# Patient Record
Sex: MALE | Race: WHITE | Employment: OTHER | URBAN - METROPOLITAN AREA
[De-identification: names, ages, dates, MRNs, and addresses within clinical notes are randomized per-mention and may not be internally consistent; named-entity substitution may affect disease eponyms.]

---

## 2018-01-01 ENCOUNTER — APPOINTMENT (OUTPATIENT)
Dept: GENERAL RADIOLOGY | Facility: CLINIC | Age: 83
DRG: 871 | End: 2018-01-01
Attending: NURSE PRACTITIONER
Payer: MEDICARE

## 2018-01-01 ENCOUNTER — APPOINTMENT (OUTPATIENT)
Dept: PHYSICAL THERAPY | Facility: CLINIC | Age: 83
DRG: 871 | End: 2018-01-01
Payer: MEDICARE

## 2018-01-01 ENCOUNTER — APPOINTMENT (OUTPATIENT)
Dept: SPEECH THERAPY | Facility: CLINIC | Age: 83
DRG: 871 | End: 2018-01-01
Payer: MEDICARE

## 2018-01-01 ENCOUNTER — APPOINTMENT (OUTPATIENT)
Dept: PHYSICAL THERAPY | Facility: CLINIC | Age: 83
DRG: 871 | End: 2018-01-01
Attending: FAMILY MEDICINE
Payer: MEDICARE

## 2018-01-01 ENCOUNTER — HOSPITAL ENCOUNTER (INPATIENT)
Facility: CLINIC | Age: 83
LOS: 6 days | Discharge: HOSPICE | DRG: 871 | End: 2018-10-08
Attending: NURSE PRACTITIONER | Admitting: INTERNAL MEDICINE
Payer: MEDICARE

## 2018-01-01 ENCOUNTER — APPOINTMENT (OUTPATIENT)
Dept: SPEECH THERAPY | Facility: CLINIC | Age: 83
DRG: 871 | End: 2018-01-01
Attending: INTERNAL MEDICINE
Payer: MEDICARE

## 2018-01-01 ENCOUNTER — APPOINTMENT (OUTPATIENT)
Dept: CARDIOLOGY | Facility: CLINIC | Age: 83
DRG: 871 | End: 2018-01-01
Attending: FAMILY MEDICINE
Payer: MEDICARE

## 2018-01-01 ENCOUNTER — OFFICE VISIT (OUTPATIENT)
Dept: FAMILY MEDICINE | Facility: OTHER | Age: 83
End: 2018-01-01
Payer: MEDICARE

## 2018-01-01 ENCOUNTER — APPOINTMENT (OUTPATIENT)
Dept: GENERAL RADIOLOGY | Facility: CLINIC | Age: 83
DRG: 871 | End: 2018-01-01
Attending: FAMILY MEDICINE
Payer: MEDICARE

## 2018-01-01 VITALS
OXYGEN SATURATION: 97 % | TEMPERATURE: 96.6 F | BODY MASS INDEX: 16.14 KG/M2 | HEIGHT: 68 IN | DIASTOLIC BLOOD PRESSURE: 73 MMHG | RESPIRATION RATE: 18 BRPM | WEIGHT: 106.48 LBS | HEART RATE: 70 BPM | SYSTOLIC BLOOD PRESSURE: 105 MMHG

## 2018-01-01 VITALS
TEMPERATURE: 97.4 F | DIASTOLIC BLOOD PRESSURE: 40 MMHG | RESPIRATION RATE: 36 BRPM | HEART RATE: 67 BPM | SYSTOLIC BLOOD PRESSURE: 61 MMHG

## 2018-01-01 DIAGNOSIS — R13.19 OTHER DYSPHAGIA: Primary | ICD-10-CM

## 2018-01-01 DIAGNOSIS — A41.9 SEPTIC SHOCK (H): ICD-10-CM

## 2018-01-01 DIAGNOSIS — R65.21 SEPTIC SHOCK (H): ICD-10-CM

## 2018-01-01 DIAGNOSIS — E43 SEVERE PROTEIN-CALORIE MALNUTRITION (H): ICD-10-CM

## 2018-01-01 DIAGNOSIS — N39.0 URINARY TRACT INFECTION IN MALE: ICD-10-CM

## 2018-01-01 DIAGNOSIS — R13.12 OROPHARYNGEAL DYSPHAGIA: ICD-10-CM

## 2018-01-01 DIAGNOSIS — R13.13 PHARYNGEAL DYSPHAGIA: Primary | ICD-10-CM

## 2018-01-01 DIAGNOSIS — J69.0 ASPIRATION PNEUMONIA OF LEFT LOWER LOBE, UNSPECIFIED ASPIRATION PNEUMONIA TYPE (H): ICD-10-CM

## 2018-01-01 LAB
ALBUMIN SERPL-MCNC: 2.4 G/DL (ref 3.4–5)
ALBUMIN UR-MCNC: 300 MG/DL
ALP SERPL-CCNC: 61 U/L (ref 40–150)
ALT SERPL W P-5'-P-CCNC: 14 U/L (ref 0–70)
ANION GAP SERPL CALCULATED.3IONS-SCNC: 10 MMOL/L (ref 3–14)
ANION GAP SERPL CALCULATED.3IONS-SCNC: 15 MMOL/L (ref 3–14)
ANION GAP SERPL CALCULATED.3IONS-SCNC: 5 MMOL/L (ref 3–14)
ANION GAP SERPL CALCULATED.3IONS-SCNC: 8 MMOL/L (ref 3–14)
ANION GAP SERPL CALCULATED.3IONS-SCNC: 9 MMOL/L (ref 3–14)
APPEARANCE UR: ABNORMAL
AST SERPL W P-5'-P-CCNC: 20 U/L (ref 0–45)
BACTERIA #/AREA URNS HPF: ABNORMAL /HPF
BACTERIA SPEC CULT: NO GROWTH
BACTERIA SPEC CULT: NORMAL
BACTERIA SPEC CULT: NORMAL
BASE DEFICIT BLDV-SCNC: 3.3 MMOL/L
BASE DEFICIT BLDV-SCNC: 8 MMOL/L
BASOPHILS # BLD AUTO: 0 10E9/L (ref 0–0.2)
BASOPHILS NFR BLD AUTO: 0.3 %
BILIRUB SERPL-MCNC: 1 MG/DL (ref 0.2–1.3)
BILIRUB UR QL STRIP: NEGATIVE
BUN SERPL-MCNC: 17 MG/DL (ref 7–30)
BUN SERPL-MCNC: 22 MG/DL (ref 7–30)
BUN SERPL-MCNC: 3 MG/DL (ref 7–30)
BUN SERPL-MCNC: 4 MG/DL (ref 7–30)
BUN SERPL-MCNC: 8 MG/DL (ref 7–30)
CA-I BLD-MCNC: 4 MG/DL (ref 4.4–5.2)
CA-I BLD-MCNC: 4.2 MG/DL (ref 4.4–5.2)
CA-I BLD-MCNC: 4.3 MG/DL (ref 4.4–5.2)
CA-I SERPL ISE-MCNC: 4.1 MG/DL (ref 4.4–5.2)
CA-I SERPL ISE-MCNC: 4.4 MG/DL (ref 4.4–5.2)
CALCIUM SERPL-MCNC: 6.7 MG/DL (ref 8.5–10.1)
CALCIUM SERPL-MCNC: 6.8 MG/DL (ref 8.5–10.1)
CALCIUM SERPL-MCNC: 7.1 MG/DL (ref 8.5–10.1)
CALCIUM SERPL-MCNC: 7.6 MG/DL (ref 8.5–10.1)
CALCIUM SERPL-MCNC: 8.6 MG/DL (ref 8.5–10.1)
CHLORIDE SERPL-SCNC: 103 MMOL/L (ref 94–109)
CHLORIDE SERPL-SCNC: 108 MMOL/L (ref 94–109)
CHLORIDE SERPL-SCNC: 108 MMOL/L (ref 94–109)
CHLORIDE SERPL-SCNC: 110 MMOL/L (ref 94–109)
CHLORIDE SERPL-SCNC: 112 MMOL/L (ref 94–109)
CO2 SERPL-SCNC: 20 MMOL/L (ref 20–32)
CO2 SERPL-SCNC: 21 MMOL/L (ref 20–32)
CO2 SERPL-SCNC: 24 MMOL/L (ref 20–32)
CO2 SERPL-SCNC: 25 MMOL/L (ref 20–32)
CO2 SERPL-SCNC: 26 MMOL/L (ref 20–32)
COLOR UR AUTO: ABNORMAL
CREAT SERPL-MCNC: 0.59 MG/DL (ref 0.66–1.25)
CREAT SERPL-MCNC: 0.78 MG/DL (ref 0.66–1.25)
CREAT SERPL-MCNC: 0.81 MG/DL (ref 0.66–1.25)
CREAT SERPL-MCNC: 0.86 MG/DL (ref 0.66–1.25)
CREAT SERPL-MCNC: 1.08 MG/DL (ref 0.66–1.25)
CRP SERPL-MCNC: 60.4 MG/L (ref 0–8)
DIFFERENTIAL METHOD BLD: ABNORMAL
EOSINOPHIL NFR BLD AUTO: 0.6 %
ERYTHROCYTE [DISTWIDTH] IN BLOOD BY AUTOMATED COUNT: 17.4 % (ref 10–15)
ERYTHROCYTE [DISTWIDTH] IN BLOOD BY AUTOMATED COUNT: 17.5 % (ref 10–15)
FERRITIN SERPL-MCNC: 655 NG/ML (ref 26–388)
FOLATE SERPL-MCNC: 14.4 NG/ML
GFR SERPL CREATININE-BSD FRML MDRD: 64 ML/MIN/1.7M2
GFR SERPL CREATININE-BSD FRML MDRD: 83 ML/MIN/1.7M2
GFR SERPL CREATININE-BSD FRML MDRD: 90 ML/MIN/1.7M2
GFR SERPL CREATININE-BSD FRML MDRD: >90 ML/MIN/1.7M2
GFR SERPL CREATININE-BSD FRML MDRD: >90 ML/MIN/1.7M2
GLUCOSE BLDC GLUCOMTR-MCNC: 106 MG/DL (ref 70–99)
GLUCOSE BLDC GLUCOMTR-MCNC: 113 MG/DL (ref 70–99)
GLUCOSE BLDC GLUCOMTR-MCNC: 113 MG/DL (ref 70–99)
GLUCOSE BLDC GLUCOMTR-MCNC: 119 MG/DL (ref 70–99)
GLUCOSE BLDC GLUCOMTR-MCNC: 77 MG/DL (ref 70–99)
GLUCOSE BLDC GLUCOMTR-MCNC: 81 MG/DL (ref 70–99)
GLUCOSE BLDC GLUCOMTR-MCNC: 85 MG/DL (ref 70–99)
GLUCOSE BLDC GLUCOMTR-MCNC: 85 MG/DL (ref 70–99)
GLUCOSE BLDC GLUCOMTR-MCNC: 88 MG/DL (ref 70–99)
GLUCOSE BLDC GLUCOMTR-MCNC: 93 MG/DL (ref 70–99)
GLUCOSE BLDC GLUCOMTR-MCNC: 95 MG/DL (ref 70–99)
GLUCOSE BLDC GLUCOMTR-MCNC: 99 MG/DL (ref 70–99)
GLUCOSE SERPL-MCNC: 107 MG/DL (ref 70–99)
GLUCOSE SERPL-MCNC: 221 MG/DL (ref 70–99)
GLUCOSE SERPL-MCNC: 72 MG/DL (ref 70–99)
GLUCOSE SERPL-MCNC: 85 MG/DL (ref 70–99)
GLUCOSE SERPL-MCNC: 94 MG/DL (ref 70–99)
GLUCOSE UR STRIP-MCNC: NEGATIVE MG/DL
HBA1C MFR BLD: 5.1 % (ref 0–5.6)
HCO3 BLDV-SCNC: 19 MMOL/L (ref 21–28)
HCO3 BLDV-SCNC: 22 MMOL/L (ref 21–28)
HCT VFR BLD AUTO: 25.9 % (ref 40–53)
HCT VFR BLD AUTO: 26.9 % (ref 40–53)
HCT VFR BLD AUTO: 27.8 % (ref 40–53)
HCT VFR BLD AUTO: 37.7 % (ref 40–53)
HEMOCCULT SP1 STL QL: NEGATIVE
HGB BLD-MCNC: 12.2 G/DL (ref 13.3–17.7)
HGB BLD-MCNC: 8.5 G/DL (ref 13.3–17.7)
HGB BLD-MCNC: 8.8 G/DL (ref 13.3–17.7)
HGB BLD-MCNC: 9 G/DL (ref 13.3–17.7)
HGB BLD-MCNC: 9.1 G/DL (ref 13.3–17.7)
HGB UR QL STRIP: ABNORMAL
IMM GRANULOCYTES # BLD: 0.2 10E9/L (ref 0–0.4)
IMM GRANULOCYTES NFR BLD: 1.1 %
IRON SATN MFR SERPL: 24 % (ref 15–46)
IRON SERPL-MCNC: 24 UG/DL (ref 35–180)
KETONES UR STRIP-MCNC: NEGATIVE MG/DL
LACTATE BLD-SCNC: 0.8 MMOL/L (ref 0.7–2)
LACTATE BLD-SCNC: 1.3 MMOL/L (ref 0.7–2)
LACTATE BLD-SCNC: 8.2 MMOL/L (ref 0.7–2)
LEUKOCYTE ESTERASE UR QL STRIP: ABNORMAL
LYMPHOCYTES # BLD AUTO: 1.8 10E9/L (ref 0.8–5.3)
LYMPHOCYTES NFR BLD AUTO: 12.4 %
Lab: NORMAL
MCH RBC QN AUTO: 30.9 PG (ref 26.5–33)
MCH RBC QN AUTO: 31.2 PG (ref 26.5–33)
MCH RBC QN AUTO: 31.2 PG (ref 26.5–33)
MCH RBC QN AUTO: 31.7 PG (ref 26.5–33)
MCHC RBC AUTO-ENTMCNC: 32.4 G/DL (ref 31.5–36.5)
MCHC RBC AUTO-ENTMCNC: 32.7 G/DL (ref 31.5–36.5)
MCHC RBC AUTO-ENTMCNC: 32.8 G/DL (ref 31.5–36.5)
MCHC RBC AUTO-ENTMCNC: 33.5 G/DL (ref 31.5–36.5)
MCV RBC AUTO: 94 FL (ref 78–100)
MCV RBC AUTO: 95 FL (ref 78–100)
MCV RBC AUTO: 95 FL (ref 78–100)
MCV RBC AUTO: 96 FL (ref 78–100)
MONOCYTES # BLD AUTO: 0.8 10E9/L (ref 0–1.3)
MONOCYTES NFR BLD AUTO: 5.8 %
NEUTROPHILS # BLD AUTO: 11.4 10E9/L (ref 1.6–8.3)
NEUTROPHILS NFR BLD AUTO: 79.8 %
NITRATE UR QL: POSITIVE
NRBC # BLD AUTO: 0 10*3/UL
NRBC BLD AUTO-RTO: 0 /100
O2/TOTAL GAS SETTING VFR VENT: 21 %
O2/TOTAL GAS SETTING VFR VENT: 21 %
PCO2 BLDV: 38 MM HG (ref 40–50)
PCO2 BLDV: 43 MM HG (ref 40–50)
PH BLDV: 7.25 PH (ref 7.32–7.43)
PH BLDV: 7.37 PH (ref 7.32–7.43)
PH UR STRIP: 6.5 PH (ref 5–7)
PLATELET # BLD AUTO: 224 10E9/L (ref 150–450)
PLATELET # BLD AUTO: 236 10E9/L (ref 150–450)
PLATELET # BLD AUTO: 300 10E9/L (ref 150–450)
PLATELET # BLD AUTO: 396 10E9/L (ref 150–450)
PO2 BLDV: 18 MM HG (ref 25–47)
PO2 BLDV: 37 MM HG (ref 25–47)
POTASSIUM SERPL-SCNC: 3 MMOL/L (ref 3.4–5.3)
POTASSIUM SERPL-SCNC: 3.2 MMOL/L (ref 3.4–5.3)
POTASSIUM SERPL-SCNC: 3.6 MMOL/L (ref 3.4–5.3)
POTASSIUM SERPL-SCNC: 3.6 MMOL/L (ref 3.4–5.3)
POTASSIUM SERPL-SCNC: 3.8 MMOL/L (ref 3.4–5.3)
POTASSIUM SERPL-SCNC: 4 MMOL/L (ref 3.4–5.3)
POTASSIUM SERPL-SCNC: 4.3 MMOL/L (ref 3.4–5.3)
PROT SERPL-MCNC: 7.1 G/DL (ref 6.8–8.8)
RBC # BLD AUTO: 2.75 10E12/L (ref 4.4–5.9)
RBC # BLD AUTO: 2.84 10E12/L (ref 4.4–5.9)
RBC # BLD AUTO: 2.92 10E12/L (ref 4.4–5.9)
RBC # BLD AUTO: 3.91 10E12/L (ref 4.4–5.9)
RBC #/AREA URNS AUTO: >182 /HPF (ref 0–2)
SODIUM SERPL-SCNC: 138 MMOL/L (ref 133–144)
SODIUM SERPL-SCNC: 139 MMOL/L (ref 133–144)
SODIUM SERPL-SCNC: 141 MMOL/L (ref 133–144)
SODIUM SERPL-SCNC: 142 MMOL/L (ref 133–144)
SODIUM SERPL-SCNC: 144 MMOL/L (ref 133–144)
SOURCE: ABNORMAL
SP GR UR STRIP: 1.01 (ref 1–1.03)
SPECIMEN SOURCE: NORMAL
SQUAMOUS #/AREA URNS AUTO: 3 /HPF (ref 0–1)
TIBC SERPL-MCNC: 99 UG/DL (ref 240–430)
TROPONIN I SERPL-MCNC: 0.03 UG/L (ref 0–0.04)
UROBILINOGEN UR STRIP-MCNC: 0.2 MG/DL (ref 0–2)
VIT B12 SERPL-MCNC: 498 PG/ML (ref 193–986)
WBC # BLD AUTO: 14.2 10E9/L (ref 4–11)
WBC # BLD AUTO: 4.6 10E9/L (ref 4–11)
WBC # BLD AUTO: 4.8 10E9/L (ref 4–11)
WBC # BLD AUTO: 7.9 10E9/L (ref 4–11)
WBC #/AREA URNS AUTO: 1382 /HPF (ref 0–5)
WBC CLUMPS #/AREA URNS HPF: PRESENT /HPF

## 2018-01-01 PROCEDURE — 97530 THERAPEUTIC ACTIVITIES: CPT | Mod: GP | Performed by: PHYSICAL THERAPIST

## 2018-01-01 PROCEDURE — 20000003 ZZH R&B ICU

## 2018-01-01 PROCEDURE — A9270 NON-COVERED ITEM OR SERVICE: HCPCS | Mod: GY | Performed by: FAMILY MEDICINE

## 2018-01-01 PROCEDURE — 84484 ASSAY OF TROPONIN QUANT: CPT | Performed by: NURSE PRACTITIONER

## 2018-01-01 PROCEDURE — 85027 COMPLETE CBC AUTOMATED: CPT | Performed by: INTERNAL MEDICINE

## 2018-01-01 PROCEDURE — 25000128 H RX IP 250 OP 636: Performed by: NURSE PRACTITIONER

## 2018-01-01 PROCEDURE — 36556 INSERT NON-TUNNEL CV CATH: CPT | Mod: Z6 | Performed by: NURSE PRACTITIONER

## 2018-01-01 PROCEDURE — 99232 SBSQ HOSP IP/OBS MODERATE 35: CPT | Performed by: FAMILY MEDICINE

## 2018-01-01 PROCEDURE — 81001 URINALYSIS AUTO W/SCOPE: CPT | Performed by: NURSE PRACTITIONER

## 2018-01-01 PROCEDURE — 25000128 H RX IP 250 OP 636: Performed by: HOSPITALIST

## 2018-01-01 PROCEDURE — 83605 ASSAY OF LACTIC ACID: CPT | Performed by: NURSE PRACTITIONER

## 2018-01-01 PROCEDURE — 82330 ASSAY OF CALCIUM: CPT | Performed by: INTERNAL MEDICINE

## 2018-01-01 PROCEDURE — 25000128 H RX IP 250 OP 636: Performed by: INTERNAL MEDICINE

## 2018-01-01 PROCEDURE — 25800025 ZZH RX 258: Performed by: FAMILY MEDICINE

## 2018-01-01 PROCEDURE — 87086 URINE CULTURE/COLONY COUNT: CPT | Performed by: NURSE PRACTITIONER

## 2018-01-01 PROCEDURE — 93005 ELECTROCARDIOGRAM TRACING: CPT | Performed by: NURSE PRACTITIONER

## 2018-01-01 PROCEDURE — 99207 ZZC MOONLIGHTING INDICATOR: CPT | Mod: 59 | Performed by: INTERNAL MEDICINE

## 2018-01-01 PROCEDURE — 82728 ASSAY OF FERRITIN: CPT | Performed by: INTERNAL MEDICINE

## 2018-01-01 PROCEDURE — 82330 ASSAY OF CALCIUM: CPT | Performed by: FAMILY MEDICINE

## 2018-01-01 PROCEDURE — 99233 SBSQ HOSP IP/OBS HIGH 50: CPT | Performed by: INTERNAL MEDICINE

## 2018-01-01 PROCEDURE — 99223 1ST HOSP IP/OBS HIGH 75: CPT | Mod: AI | Performed by: INTERNAL MEDICINE

## 2018-01-01 PROCEDURE — 40000225 ZZH STATISTIC SLP WARD VISIT: Performed by: SPEECH-LANGUAGE PATHOLOGIST

## 2018-01-01 PROCEDURE — 25000132 ZZH RX MED GY IP 250 OP 250 PS 637: Mod: GY | Performed by: FAMILY MEDICINE

## 2018-01-01 PROCEDURE — 00000146 ZZHCL STATISTIC GLUCOSE BY METER IP

## 2018-01-01 PROCEDURE — 80048 BASIC METABOLIC PNL TOTAL CA: CPT | Performed by: FAMILY MEDICINE

## 2018-01-01 PROCEDURE — 85025 COMPLETE CBC W/AUTO DIFF WBC: CPT | Performed by: NURSE PRACTITIONER

## 2018-01-01 PROCEDURE — 40000193 ZZH STATISTIC PT WARD VISIT: Performed by: PHYSICAL THERAPIST

## 2018-01-01 PROCEDURE — 99356 ZZC PROLONGED SERV,INPATIENT,1ST HR: CPT | Performed by: FAMILY MEDICINE

## 2018-01-01 PROCEDURE — 93306 TTE W/DOPPLER COMPLETE: CPT

## 2018-01-01 PROCEDURE — 96368 THER/DIAG CONCURRENT INF: CPT | Performed by: NURSE PRACTITIONER

## 2018-01-01 PROCEDURE — 25500064 ZZH RX 255 OP 636: Performed by: INTERNAL MEDICINE

## 2018-01-01 PROCEDURE — 92611 MOTION FLUOROSCOPY/SWALLOW: CPT | Mod: GN | Performed by: SPEECH-LANGUAGE PATHOLOGIST

## 2018-01-01 PROCEDURE — 82607 VITAMIN B-12: CPT | Performed by: INTERNAL MEDICINE

## 2018-01-01 PROCEDURE — 25000128 H RX IP 250 OP 636: Performed by: FAMILY MEDICINE

## 2018-01-01 PROCEDURE — 96366 THER/PROPH/DIAG IV INF ADDON: CPT | Performed by: NURSE PRACTITIONER

## 2018-01-01 PROCEDURE — 99207 ZZC CDG-MDM COMPONENT: MEETS MODERATE - UP CODED: CPT | Performed by: INTERNAL MEDICINE

## 2018-01-01 PROCEDURE — 40000895 ZZH STATISTIC SLP IP EVAL DEFER: Performed by: SPEECH-LANGUAGE PATHOLOGIST

## 2018-01-01 PROCEDURE — 85018 HEMOGLOBIN: CPT | Performed by: FAMILY MEDICINE

## 2018-01-01 PROCEDURE — 99215 OFFICE O/P EST HI 40 MIN: CPT | Mod: 25 | Performed by: NURSE PRACTITIONER

## 2018-01-01 PROCEDURE — 97162 PT EVAL MOD COMPLEX 30 MIN: CPT | Mod: GP | Performed by: PHYSICAL THERAPIST

## 2018-01-01 PROCEDURE — 80048 BASIC METABOLIC PNL TOTAL CA: CPT | Performed by: INTERNAL MEDICINE

## 2018-01-01 PROCEDURE — 99207 ZZC OFFICE-HOSPITAL ADMIT: CPT | Performed by: FAMILY MEDICINE

## 2018-01-01 PROCEDURE — 71045 X-RAY EXAM CHEST 1 VIEW: CPT | Mod: TC

## 2018-01-01 PROCEDURE — 83036 HEMOGLOBIN GLYCOSYLATED A1C: CPT | Performed by: INTERNAL MEDICINE

## 2018-01-01 PROCEDURE — 83605 ASSAY OF LACTIC ACID: CPT | Performed by: INTERNAL MEDICINE

## 2018-01-01 PROCEDURE — 36556 INSERT NON-TUNNEL CV CATH: CPT | Performed by: NURSE PRACTITIONER

## 2018-01-01 PROCEDURE — 80053 COMPREHEN METABOLIC PANEL: CPT | Performed by: NURSE PRACTITIONER

## 2018-01-01 PROCEDURE — 87040 BLOOD CULTURE FOR BACTERIA: CPT | Performed by: NURSE PRACTITIONER

## 2018-01-01 PROCEDURE — 84132 ASSAY OF SERUM POTASSIUM: CPT | Performed by: INTERNAL MEDICINE

## 2018-01-01 PROCEDURE — 12000000 ZZH R&B MED SURG/OB

## 2018-01-01 PROCEDURE — 83540 ASSAY OF IRON: CPT | Performed by: INTERNAL MEDICINE

## 2018-01-01 PROCEDURE — 96361 HYDRATE IV INFUSION ADD-ON: CPT | Performed by: NURSE PRACTITIONER

## 2018-01-01 PROCEDURE — 40000985 XR CHEST PORT 1 VW

## 2018-01-01 PROCEDURE — 83550 IRON BINDING TEST: CPT | Performed by: INTERNAL MEDICINE

## 2018-01-01 PROCEDURE — 82746 ASSAY OF FOLIC ACID SERUM: CPT | Performed by: INTERNAL MEDICINE

## 2018-01-01 PROCEDURE — 93010 ELECTROCARDIOGRAM REPORT: CPT | Mod: Z6 | Performed by: NURSE PRACTITIONER

## 2018-01-01 PROCEDURE — 92610 EVALUATE SWALLOWING FUNCTION: CPT | Mod: GN | Performed by: SPEECH-LANGUAGE PATHOLOGIST

## 2018-01-01 PROCEDURE — 99285 EMERGENCY DEPT VISIT HI MDM: CPT | Mod: 25 | Performed by: NURSE PRACTITIONER

## 2018-01-01 PROCEDURE — 25000125 ZZHC RX 250: Performed by: NURSE PRACTITIONER

## 2018-01-01 PROCEDURE — 82803 BLOOD GASES ANY COMBINATION: CPT | Performed by: INTERNAL MEDICINE

## 2018-01-01 PROCEDURE — 93005 ELECTROCARDIOGRAM TRACING: CPT

## 2018-01-01 PROCEDURE — 99207 ZZC CDG-MDM COMPONENT: MEETS MODERATE - UP CODED: CPT | Performed by: FAMILY MEDICINE

## 2018-01-01 PROCEDURE — 36415 COLL VENOUS BLD VENIPUNCTURE: CPT | Performed by: INTERNAL MEDICINE

## 2018-01-01 PROCEDURE — 99233 SBSQ HOSP IP/OBS HIGH 50: CPT | Performed by: FAMILY MEDICINE

## 2018-01-01 PROCEDURE — 93306 TTE W/DOPPLER COMPLETE: CPT | Mod: 26 | Performed by: INTERNAL MEDICINE

## 2018-01-01 PROCEDURE — 86140 C-REACTIVE PROTEIN: CPT | Performed by: NURSE PRACTITIONER

## 2018-01-01 PROCEDURE — 82274 ASSAY TEST FOR BLOOD FECAL: CPT | Performed by: INTERNAL MEDICINE

## 2018-01-01 PROCEDURE — 85027 COMPLETE CBC AUTOMATED: CPT | Performed by: FAMILY MEDICINE

## 2018-01-01 PROCEDURE — 3E043XZ INTRODUCTION OF VASOPRESSOR INTO CENTRAL VEIN, PERCUTANEOUS APPROACH: ICD-10-PCS | Performed by: EMERGENCY MEDICINE

## 2018-01-01 PROCEDURE — 74230 X-RAY XM SWLNG FUNCJ C+: CPT | Mod: TC

## 2018-01-01 PROCEDURE — 99239 HOSP IP/OBS DSCHRG MGMT >30: CPT | Performed by: FAMILY MEDICINE

## 2018-01-01 PROCEDURE — 92526 ORAL FUNCTION THERAPY: CPT | Mod: GN | Performed by: SPEECH-LANGUAGE PATHOLOGIST

## 2018-01-01 PROCEDURE — 96365 THER/PROPH/DIAG IV INF INIT: CPT | Performed by: NURSE PRACTITIONER

## 2018-01-01 PROCEDURE — 82803 BLOOD GASES ANY COMBINATION: CPT | Performed by: NURSE PRACTITIONER

## 2018-01-01 RX ORDER — POTASSIUM CHLORIDE 1.5 G/1.58G
20-40 POWDER, FOR SOLUTION ORAL
Status: DISCONTINUED | OUTPATIENT
Start: 2018-01-01 | End: 2018-01-01 | Stop reason: HOSPADM

## 2018-01-01 RX ORDER — POTASSIUM CHLORIDE 7.45 MG/ML
10 INJECTION INTRAVENOUS
Status: DISCONTINUED | OUTPATIENT
Start: 2018-01-01 | End: 2018-01-01 | Stop reason: HOSPADM

## 2018-01-01 RX ORDER — AMOXICILLIN AND CLAVULANATE POTASSIUM 400; 57 MG/5ML; MG/5ML
500 POWDER, FOR SUSPENSION ORAL 3 TIMES DAILY
Qty: 75.6 ML | Refills: 0 | Status: SHIPPED | OUTPATIENT
Start: 2018-01-01 | End: 2018-01-01

## 2018-01-01 RX ORDER — SODIUM CHLORIDE 9 MG/ML
INJECTION, SOLUTION INTRAVENOUS CONTINUOUS
Status: DISCONTINUED | OUTPATIENT
Start: 2018-01-01 | End: 2018-01-01

## 2018-01-01 RX ORDER — DEXTROSE MONOHYDRATE 25 G/50ML
25-50 INJECTION, SOLUTION INTRAVENOUS
Status: DISCONTINUED | OUTPATIENT
Start: 2018-01-01 | End: 2018-01-01 | Stop reason: HOSPADM

## 2018-01-01 RX ORDER — NICOTINE POLACRILEX 4 MG
15-30 LOZENGE BUCCAL
Status: DISCONTINUED | OUTPATIENT
Start: 2018-01-01 | End: 2018-01-01

## 2018-01-01 RX ORDER — NICOTINE POLACRILEX 4 MG
15-30 LOZENGE BUCCAL
Status: DISCONTINUED | OUTPATIENT
Start: 2018-01-01 | End: 2018-01-01 | Stop reason: HOSPADM

## 2018-01-01 RX ORDER — HEPARIN SODIUM,PORCINE 10 UNIT/ML
5-10 VIAL (ML) INTRAVENOUS EVERY 24 HOURS
Status: DISCONTINUED | OUTPATIENT
Start: 2018-01-01 | End: 2018-01-01 | Stop reason: HOSPADM

## 2018-01-01 RX ORDER — LIDOCAINE 40 MG/G
CREAM TOPICAL
Status: DISCONTINUED | OUTPATIENT
Start: 2018-01-01 | End: 2018-01-01

## 2018-01-01 RX ORDER — LORAZEPAM 2 MG/ML
0.5 INJECTION INTRAMUSCULAR EVERY 4 HOURS PRN
Status: DISCONTINUED | OUTPATIENT
Start: 2018-01-01 | End: 2018-01-01 | Stop reason: HOSPADM

## 2018-01-01 RX ORDER — POTASSIUM CHLORIDE 29.8 MG/ML
20 INJECTION INTRAVENOUS
Status: DISCONTINUED | OUTPATIENT
Start: 2018-01-01 | End: 2018-01-01 | Stop reason: HOSPADM

## 2018-01-01 RX ORDER — ONDANSETRON 4 MG/1
4 TABLET, ORALLY DISINTEGRATING ORAL EVERY 6 HOURS PRN
Status: DISCONTINUED | OUTPATIENT
Start: 2018-01-01 | End: 2018-01-01 | Stop reason: HOSPADM

## 2018-01-01 RX ORDER — LIDOCAINE 40 MG/G
CREAM TOPICAL
Status: DISCONTINUED | OUTPATIENT
Start: 2018-01-01 | End: 2018-01-01 | Stop reason: HOSPADM

## 2018-01-01 RX ORDER — DEXTROSE MONOHYDRATE, SODIUM CHLORIDE, AND POTASSIUM CHLORIDE 50; 1.49; 4.5 G/1000ML; G/1000ML; G/1000ML
INJECTION, SOLUTION INTRAVENOUS CONTINUOUS
Status: DISCONTINUED | OUTPATIENT
Start: 2018-01-01 | End: 2018-01-01 | Stop reason: HOSPADM

## 2018-01-01 RX ORDER — POTASSIUM CL/LIDO/0.9 % NACL 10MEQ/0.1L
10 INTRAVENOUS SOLUTION, PIGGYBACK (ML) INTRAVENOUS
Status: DISCONTINUED | OUTPATIENT
Start: 2018-01-01 | End: 2018-01-01 | Stop reason: RX

## 2018-01-01 RX ORDER — ONDANSETRON 2 MG/ML
4 INJECTION INTRAMUSCULAR; INTRAVENOUS EVERY 6 HOURS PRN
Status: DISCONTINUED | OUTPATIENT
Start: 2018-01-01 | End: 2018-01-01 | Stop reason: HOSPADM

## 2018-01-01 RX ORDER — POTASSIUM CHLORIDE 1500 MG/1
20-40 TABLET, EXTENDED RELEASE ORAL
Status: DISCONTINUED | OUTPATIENT
Start: 2018-01-01 | End: 2018-01-01 | Stop reason: HOSPADM

## 2018-01-01 RX ORDER — HEPARIN SODIUM,PORCINE 10 UNIT/ML
5-10 VIAL (ML) INTRAVENOUS
Status: DISCONTINUED | OUTPATIENT
Start: 2018-01-01 | End: 2018-01-01 | Stop reason: HOSPADM

## 2018-01-01 RX ORDER — DEXTROSE MONOHYDRATE 25 G/50ML
25-50 INJECTION, SOLUTION INTRAVENOUS
Status: DISCONTINUED | OUTPATIENT
Start: 2018-01-01 | End: 2018-01-01

## 2018-01-01 RX ORDER — AMOXICILLIN AND CLAVULANATE POTASSIUM 400; 57 MG/5ML; MG/5ML
500 POWDER, FOR SUSPENSION ORAL 3 TIMES DAILY
Status: DISCONTINUED | OUTPATIENT
Start: 2018-01-01 | End: 2018-01-01 | Stop reason: HOSPADM

## 2018-01-01 RX ORDER — NALOXONE HYDROCHLORIDE 0.4 MG/ML
.1-.4 INJECTION, SOLUTION INTRAMUSCULAR; INTRAVENOUS; SUBCUTANEOUS
Status: DISCONTINUED | OUTPATIENT
Start: 2018-01-01 | End: 2018-01-01 | Stop reason: HOSPADM

## 2018-01-01 RX ADMIN — CALCIUM GLUCONATE 4 G: 98 INJECTION, SOLUTION INTRAVENOUS at 10:57

## 2018-01-01 RX ADMIN — SODIUM CHLORIDE, POTASSIUM CHLORIDE, SODIUM LACTATE AND CALCIUM CHLORIDE 1000 ML: 600; 310; 30; 20 INJECTION, SOLUTION INTRAVENOUS at 06:34

## 2018-01-01 RX ADMIN — PIPERACILLIN SODIUM AND TAZOBACTAM SODIUM 3.38 G: 3; .375 INJECTION, POWDER, LYOPHILIZED, FOR SOLUTION INTRAVENOUS at 12:04

## 2018-01-01 RX ADMIN — PIPERACILLIN AND TAZOBACTAM 4.5 G: 4; .5 INJECTION, POWDER, FOR SOLUTION INTRAVENOUS at 18:18

## 2018-01-01 RX ADMIN — SODIUM CHLORIDE: 9 INJECTION, SOLUTION INTRAVENOUS at 21:33

## 2018-01-01 RX ADMIN — SODIUM CHLORIDE: 9 INJECTION, SOLUTION INTRAVENOUS at 06:33

## 2018-01-01 RX ADMIN — POTASSIUM CHLORIDE, DEXTROSE MONOHYDRATE AND SODIUM CHLORIDE: 150; 5; 450 INJECTION, SOLUTION INTRAVENOUS at 04:33

## 2018-01-01 RX ADMIN — PIPERACILLIN SODIUM AND TAZOBACTAM SODIUM 3.38 G: 3; .375 INJECTION, POWDER, LYOPHILIZED, FOR SOLUTION INTRAVENOUS at 18:04

## 2018-01-01 RX ADMIN — HEPARIN, PORCINE (PF) 10 UNIT/ML INTRAVENOUS SYRINGE 10 ML: at 21:47

## 2018-01-01 RX ADMIN — SODIUM CHLORIDE: 9 INJECTION, SOLUTION INTRAVENOUS at 14:32

## 2018-01-01 RX ADMIN — PIPERACILLIN SODIUM AND TAZOBACTAM SODIUM 3.38 G: 3; .375 INJECTION, POWDER, LYOPHILIZED, FOR SOLUTION INTRAVENOUS at 00:25

## 2018-01-01 RX ADMIN — HEPARIN, PORCINE (PF) 10 UNIT/ML INTRAVENOUS SYRINGE 5 ML: at 20:22

## 2018-01-01 RX ADMIN — SODIUM CHLORIDE 1000 ML: 9 INJECTION, SOLUTION INTRAVENOUS at 15:46

## 2018-01-01 RX ADMIN — CEFTRIAXONE 1 G: 1 INJECTION, SOLUTION INTRAVENOUS at 17:07

## 2018-01-01 RX ADMIN — PIPERACILLIN SODIUM AND TAZOBACTAM SODIUM 3.38 G: 3; .375 INJECTION, POWDER, LYOPHILIZED, FOR SOLUTION INTRAVENOUS at 12:30

## 2018-01-01 RX ADMIN — PIPERACILLIN SODIUM AND TAZOBACTAM SODIUM 3.38 G: 3; .375 INJECTION, POWDER, LYOPHILIZED, FOR SOLUTION INTRAVENOUS at 05:25

## 2018-01-01 RX ADMIN — POTASSIUM CHLORIDE, DEXTROSE MONOHYDRATE AND SODIUM CHLORIDE: 150; 5; 450 INJECTION, SOLUTION INTRAVENOUS at 17:30

## 2018-01-01 RX ADMIN — CALCIUM GLUCONATE 4 G: 98 INJECTION, SOLUTION INTRAVENOUS at 19:08

## 2018-01-01 RX ADMIN — AMOXICILLIN AND CLAVULANATE POTASSIUM 500 MG: 400; 57 POWDER, FOR SUSPENSION ORAL at 21:29

## 2018-01-01 RX ADMIN — AZITHROMYCIN MONOHYDRATE 500 MG: 500 INJECTION, POWDER, LYOPHILIZED, FOR SOLUTION INTRAVENOUS at 21:37

## 2018-01-01 RX ADMIN — HEPARIN, PORCINE (PF) 10 UNIT/ML INTRAVENOUS SYRINGE 5 ML: at 06:40

## 2018-01-01 RX ADMIN — POTASSIUM CHLORIDE, DEXTROSE MONOHYDRATE AND SODIUM CHLORIDE: 150; 5; 450 INJECTION, SOLUTION INTRAVENOUS at 17:16

## 2018-01-01 RX ADMIN — HEPARIN, PORCINE (PF) 10 UNIT/ML INTRAVENOUS SYRINGE 5 ML: at 22:17

## 2018-01-01 RX ADMIN — POTASSIUM CHLORIDE 20 MEQ: 400 INJECTION, SOLUTION INTRAVENOUS at 07:47

## 2018-01-01 RX ADMIN — PIPERACILLIN SODIUM AND TAZOBACTAM SODIUM 3.38 G: 3; .375 INJECTION, POWDER, LYOPHILIZED, FOR SOLUTION INTRAVENOUS at 00:10

## 2018-01-01 RX ADMIN — PIPERACILLIN SODIUM AND TAZOBACTAM SODIUM 3.38 G: 3; .375 INJECTION, POWDER, LYOPHILIZED, FOR SOLUTION INTRAVENOUS at 05:16

## 2018-01-01 RX ADMIN — POTASSIUM CHLORIDE, DEXTROSE MONOHYDRATE AND SODIUM CHLORIDE: 150; 5; 450 INJECTION, SOLUTION INTRAVENOUS at 09:57

## 2018-01-01 RX ADMIN — SODIUM CHLORIDE: 9 INJECTION, SOLUTION INTRAVENOUS at 22:06

## 2018-01-01 RX ADMIN — POTASSIUM CHLORIDE, DEXTROSE MONOHYDRATE AND SODIUM CHLORIDE: 150; 5; 450 INJECTION, SOLUTION INTRAVENOUS at 04:02

## 2018-01-01 RX ADMIN — PIPERACILLIN SODIUM AND TAZOBACTAM SODIUM 3.38 G: 3; .375 INJECTION, POWDER, LYOPHILIZED, FOR SOLUTION INTRAVENOUS at 05:26

## 2018-01-01 RX ADMIN — PIPERACILLIN SODIUM AND TAZOBACTAM SODIUM 3.38 G: 3; .375 INJECTION, POWDER, LYOPHILIZED, FOR SOLUTION INTRAVENOUS at 23:49

## 2018-01-01 RX ADMIN — POTASSIUM CHLORIDE, DEXTROSE MONOHYDRATE AND SODIUM CHLORIDE: 150; 5; 450 INJECTION, SOLUTION INTRAVENOUS at 23:32

## 2018-01-01 RX ADMIN — POTASSIUM CHLORIDE 20 MEQ: 400 INJECTION, SOLUTION INTRAVENOUS at 06:40

## 2018-01-01 RX ADMIN — POTASSIUM CHLORIDE 10 MEQ: 7.46 INJECTION, SOLUTION INTRAVENOUS at 08:17

## 2018-01-01 RX ADMIN — CALCIUM GLUCONATE 1 G: 98 INJECTION, SOLUTION INTRAVENOUS at 10:50

## 2018-01-01 RX ADMIN — HEPARIN, PORCINE (PF) 10 UNIT/ML INTRAVENOUS SYRINGE 10 ML: at 21:29

## 2018-01-01 RX ADMIN — POTASSIUM CHLORIDE, DEXTROSE MONOHYDRATE AND SODIUM CHLORIDE: 150; 5; 450 INJECTION, SOLUTION INTRAVENOUS at 14:08

## 2018-01-01 RX ADMIN — AMOXICILLIN AND CLAVULANATE POTASSIUM 500 MG: 400; 57 POWDER, FOR SUSPENSION ORAL at 13:21

## 2018-01-01 RX ADMIN — POTASSIUM CHLORIDE 10 MEQ: 7.46 INJECTION, SOLUTION INTRAVENOUS at 09:16

## 2018-01-01 RX ADMIN — PIPERACILLIN SODIUM AND TAZOBACTAM SODIUM 3.38 G: 3; .375 INJECTION, POWDER, LYOPHILIZED, FOR SOLUTION INTRAVENOUS at 12:18

## 2018-01-01 RX ADMIN — POTASSIUM CHLORIDE 10 MEQ: 7.46 INJECTION, SOLUTION INTRAVENOUS at 06:23

## 2018-01-01 RX ADMIN — AZITHROMYCIN MONOHYDRATE 500 MG: 500 INJECTION, POWDER, LYOPHILIZED, FOR SOLUTION INTRAVENOUS at 23:18

## 2018-01-01 RX ADMIN — SODIUM CHLORIDE, POTASSIUM CHLORIDE, SODIUM LACTATE AND CALCIUM CHLORIDE 1000 ML: 600; 310; 30; 20 INJECTION, SOLUTION INTRAVENOUS at 22:06

## 2018-01-01 RX ADMIN — SODIUM CHLORIDE: 9 INJECTION, SOLUTION INTRAVENOUS at 20:32

## 2018-01-01 RX ADMIN — HEPARIN, PORCINE (PF) 10 UNIT/ML INTRAVENOUS SYRINGE 10 ML: at 21:42

## 2018-01-01 RX ADMIN — PIPERACILLIN SODIUM AND TAZOBACTAM SODIUM 3.38 G: 3; .375 INJECTION, POWDER, LYOPHILIZED, FOR SOLUTION INTRAVENOUS at 11:58

## 2018-01-01 RX ADMIN — AMOXICILLIN AND CLAVULANATE POTASSIUM 500 MG: 400; 57 POWDER, FOR SUSPENSION ORAL at 08:23

## 2018-01-01 RX ADMIN — POTASSIUM CHLORIDE, DEXTROSE MONOHYDRATE AND SODIUM CHLORIDE: 150; 5; 450 INJECTION, SOLUTION INTRAVENOUS at 15:33

## 2018-01-01 RX ADMIN — SODIUM CHLORIDE: 9 INJECTION, SOLUTION INTRAVENOUS at 09:01

## 2018-01-01 RX ADMIN — AMOXICILLIN AND CLAVULANATE POTASSIUM 500 MG: 400; 57 POWDER, FOR SUSPENSION ORAL at 19:54

## 2018-01-01 RX ADMIN — PIPERACILLIN SODIUM AND TAZOBACTAM SODIUM 3.38 G: 3; .375 INJECTION, POWDER, LYOPHILIZED, FOR SOLUTION INTRAVENOUS at 18:38

## 2018-01-01 RX ADMIN — CALCIUM GLUCONATE 1 G: 98 INJECTION, SOLUTION INTRAVENOUS at 15:06

## 2018-01-01 RX ADMIN — HUMAN ALBUMIN MICROSPHERES AND PERFLUTREN 7 ML: 10; .22 INJECTION, SOLUTION INTRAVENOUS at 10:30

## 2018-01-01 RX ADMIN — POTASSIUM CHLORIDE, DEXTROSE MONOHYDRATE AND SODIUM CHLORIDE: 150; 5; 450 INJECTION, SOLUTION INTRAVENOUS at 07:43

## 2018-01-01 RX ADMIN — AZITHROMYCIN MONOHYDRATE 500 MG: 500 INJECTION, POWDER, LYOPHILIZED, FOR SOLUTION INTRAVENOUS at 21:32

## 2018-01-01 RX ADMIN — LORAZEPAM 0.5 MG: 2 INJECTION INTRAMUSCULAR; INTRAVENOUS at 21:05

## 2018-01-01 RX ADMIN — AZITHROMYCIN MONOHYDRATE 500 MG: 500 INJECTION, POWDER, LYOPHILIZED, FOR SOLUTION INTRAVENOUS at 21:42

## 2018-01-01 RX ADMIN — POTASSIUM CHLORIDE 10 MEQ: 7.46 INJECTION, SOLUTION INTRAVENOUS at 07:17

## 2018-01-01 RX ADMIN — AZITHROMYCIN MONOHYDRATE 500 MG: 500 INJECTION, POWDER, LYOPHILIZED, FOR SOLUTION INTRAVENOUS at 22:17

## 2018-01-01 RX ADMIN — PIPERACILLIN SODIUM AND TAZOBACTAM SODIUM 3.38 G: 3; .375 INJECTION, POWDER, LYOPHILIZED, FOR SOLUTION INTRAVENOUS at 05:49

## 2018-01-01 RX ADMIN — PIPERACILLIN SODIUM AND TAZOBACTAM SODIUM 3.38 G: 3; .375 INJECTION, POWDER, LYOPHILIZED, FOR SOLUTION INTRAVENOUS at 23:34

## 2018-01-01 RX ADMIN — PIPERACILLIN SODIUM AND TAZOBACTAM SODIUM 3.38 G: 3; .375 INJECTION, POWDER, LYOPHILIZED, FOR SOLUTION INTRAVENOUS at 18:16

## 2018-01-01 RX ADMIN — AMOXICILLIN AND CLAVULANATE POTASSIUM 500 MG: 400; 57 POWDER, FOR SUSPENSION ORAL at 08:15

## 2018-01-01 RX ADMIN — SODIUM CHLORIDE 1000 ML: 9 INJECTION, SOLUTION INTRAVENOUS at 17:46

## 2018-01-01 RX ADMIN — NOREPINEPHRINE BITARTRATE 0.03 MCG/KG/MIN: 1 INJECTION, SOLUTION, CONCENTRATE INTRAVENOUS at 18:09

## 2018-01-01 ASSESSMENT — ACTIVITIES OF DAILY LIVING (ADL)
ADLS_ACUITY_SCORE: 31
TRANSFERRING: 3-->ASSISTIVE EQUIPMENT AND PERSON
ADLS_ACUITY_SCORE: 31
SWALLOWING: 2-->DIFFICULTY SWALLOWING LIQUIDS/FOODS
ADLS_ACUITY_SCORE: 31
DRESS: 2-->ASSISTIVE PERSON
ADLS_ACUITY_SCORE: 32
FALL_HISTORY_WITHIN_LAST_SIX_MONTHS: YES
BATHING: 3-->ASSISTIVE EQUIPMENT AND PERSON
ADLS_ACUITY_SCORE: 31
ADLS_ACUITY_SCORE: 32
RETIRED_COMMUNICATION: 2-->DIFFICULTY UNDERSTANDING (NOT RELATED TO LANGUAGE BARRIER)
ADLS_ACUITY_SCORE: 31
ADLS_ACUITY_SCORE: 32
ADLS_ACUITY_SCORE: 31
ADLS_ACUITY_SCORE: 32
ADLS_ACUITY_SCORE: 31
COGNITION: 2 - DIFFICULTY WITH ORGANIZING THOUGHTS
AMBULATION: 4-->COMPLETELY DEPENDENT
ADLS_ACUITY_SCORE: 31
ADLS_ACUITY_SCORE: 31
RETIRED_EATING: 2-->ASSISTIVE PERSON
ADLS_ACUITY_SCORE: 32
TOILETING: 1-->ASSISTIVE EQUIPMENT
ADLS_ACUITY_SCORE: 31
ADLS_ACUITY_SCORE: 31
ADLS_ACUITY_SCORE: 32
ADLS_ACUITY_SCORE: 31

## 2018-01-01 ASSESSMENT — PAIN SCALES - GENERAL: PAINLEVEL: NO PAIN (0)

## 2018-01-01 ASSESSMENT — ENCOUNTER SYMPTOMS
CHOKING: 1
APPETITE CHANGE: 1

## 2018-10-02 PROBLEM — N30.01 ACUTE CYSTITIS WITH HEMATURIA: Status: ACTIVE | Noted: 2018-01-01

## 2018-10-02 PROBLEM — A41.9 SEPSIS (H): Status: ACTIVE | Noted: 2018-01-01

## 2018-10-02 PROBLEM — R13.10 DYSPHAGIA: Status: ACTIVE | Noted: 2018-01-01

## 2018-10-02 PROBLEM — R73.9 HYPERGLYCEMIA: Status: ACTIVE | Noted: 2018-01-01

## 2018-10-02 PROBLEM — J69.0 ASPIRATION PNEUMONIA (H): Status: ACTIVE | Noted: 2018-01-01

## 2018-10-02 PROBLEM — E87.20 LACTIC ACIDOSIS: Status: ACTIVE | Noted: 2018-01-01

## 2018-10-02 PROBLEM — E46 PROTEIN-CALORIE MALNUTRITION (H): Status: ACTIVE | Noted: 2018-01-01

## 2018-10-02 PROBLEM — A41.9 SEPTIC SHOCK (H): Status: ACTIVE | Noted: 2018-01-01

## 2018-10-02 PROBLEM — R65.21 SEPTIC SHOCK (H): Status: ACTIVE | Noted: 2018-01-01

## 2018-10-02 NOTE — MR AVS SNAPSHOT
After Visit Summary   10/2/2018    Brady Santiago    MRN: [id number]           Patient Information     Date Of Birth          2/26/1927        Visit Information        Provider Department      10/2/2018 2:00 PM Gerald Houston MD [facility]        Today's Diagnoses     Other dysphagia    -  1       Follow-ups after your visit        Who to contact     If you have questions or need follow up information about today's clinic visit or your schedule please contact [facility] directly at [phone].  Normal or non-critical lab and imaging results will be communicated to you by MyChart, letter or phone within 4 business days after the clinic has received the results. If you do not hear from us within 7 days, please contact the clinic through MyChart or phone. If you have a critical or abnormal lab result, we will notify you by phone as soon as possible.  Submit refill requests through "[employer] or call your pharmacy and they will forward the refill request to us. Please allow 3 business days for your refill to be completed.          Additional Information About Your Visit        Care EveryWhere ID     This is your Care EveryWhere ID. This could be used by other organizations to access your [facility] medical records  [id number]         Blood Pressure from Last 3 Encounters:   No data found for BP    Weight from Last 3 Encounters:   No data found for Wt              Today, you had the following     No orders found for display       Primary Care Provider Fax #    Physician No Ref-Primary [phone]       No address on file        Equal Access to Services     YESENIA ANDREWS : Hadmonique Estrada, waaxda luqadaha, qaybta kaalpapa stapleton. So [facility] [phone].    ATENCIÓN: Si habla español, tiene a lucas disposición servicios gratuitos de asistencia lingüística. Robin al [phone].    We comply with applicable federal civil rights  laws and [place] laws. We do not discriminate on the basis of race, color, national origin, age, disability, sex, sexual orientation, or gender identity.            Thank you!     Thank you for choosing [facility]  for your care. Our goal is always to provide you with excellent care. Hearing back from our patients is one way we can continue to improve our services. Please take a few minutes to complete the written survey that you may receive in the mail after your visit with us. Thank you!             Your Updated Medication List - Protect others around you: Learn how to safely use, store and throw away your medicines at www.disposemymeds.org.      Notice  As of 10/2/2018  3:18 PM    You have not been prescribed any medications.

## 2018-10-02 NOTE — IP AVS SNAPSHOT
[address] [facility]    [address] [facility]     [place] [address]    Phone:  [phone]                                       After Visit Summary   10/2/2018    Brady Santiago    MRN: [id number]           After Visit Summary Signature Page     I have received my discharge instructions, and my questions have been answered. I have discussed any challenges I see with this plan with the nurse or doctor.    ..........................................................................................................................................  Patient/Patient Representative Signature      ..........................................................................................................................................  Patient Representative Print Name and Relationship to Patient    ..................................................               ................................................  Date                                   Time    ..........................................................................................................................................  Reviewed by Signature/Title    ...................................................              ..............................................  Date                                               Time          22EPIC Rev 08/18

## 2018-10-02 NOTE — IP AVS SNAPSHOT
MRN:[id number]                      After Visit Summary   10/2/2018    Brady Santiago    MRN: [id number]           Thank you!     Thank you for choosing [facility] for your care. Our goal is always to provide you with excellent care. Hearing back from our patients is one way we can continue to improve our services. Please take a few minutes to complete the written survey that you may receive in the mail after you visit with us. Thank you!        Patient Information     Date Of Birth          2/26/1927        Designated Caregiver       Most Recent Value    Caregiver    Will someone help with your care after discharge? yes    Name of designated caregiver Gemma    Phone number of caregiver [phone]    Caregiver address [address] [address] [place] [address]      About your hospital stay     You were admitted on:  October 2, 2018 You last received care in the:  [address]    You were discharged on:  October 8, 2018        Reason for your hospital stay       Aspiration pneumonia causing sepsis which tried to shut your body down.  You have improved with the antibiotics given during this hospitalization and will continue with the Amoxicillin antibiotics for another 4 days as you go home to fully treat this infection.  Unfortunately it was discovered that you are having severe difficulty with swallowing and are at high risk for another pneumonia caused by aspirating food or drink or even your own saliva happening in the future.  After much conversation during this stay, you will be going home with hospice and focus with will on the quality of life you have, allowing you to eat and drink as you like.                  Who to Call     For medical emergencies, please call 911.  For non-urgent questions about your medical care, please call your primary care provider or clinic, None          Attending Provider     Provider Specialty    Samaria Ashford APRN CNP Nurse Practitioner -  Family    Cade Fishman MD Internal Medicine    Neelima Villafuerte MD [facility]       Primary Care Provider Fax #    Physician No Ref-Primary [phone]      After Care Instructions     Activity       Your activity upon discharge: activity as tolerated            Diet       Follow this diet upon discharge: Regular                  Follow-up Appointments     Follow-up and recommended labs and tests        Follow up with hospice team tomorrow at your home.                  Additional Services     HOSPICE REFERRAL       **Order classes of: [facility], [facility] and [facility] will route to the Home Care and Hospice Referral Pool.  Home Care or Hospice will then contact the patient to schedule their appointment.**    Hospice eligibility overview: prognosis of 6 months or less, end stage disease, patient goals are comfort only, patient is not seeking curative treatment.    If you do not hear from Hospice, or you would like to call to schedule, please call the referring place of service that your provider has listed below.  ______________________________________________________________________    Your provider has referred you to: [facility]: [facility] [phone]   [email or web address]    Anticipated discharge date 10/8/18 or 10/9/18.  PLEASE Schedule Hospice consult for 24 - 48 hours.  Please call if there is need for a variance to this timeline.    REASON FOR REFERRAL: Hospice Diagnosis: Severe dysphagia, aspiration pneumonia, weight loss    ADDITIONAL SERVICES NEEDED: none identified    OTHER PERTINENT INFORMATION: Patient was last seen by provider on 10/6/18 for septic shock from aspiration pneumonia, discovery of severe dysphagia and patient not safe to swallow any food or drink without aspiration risk.  Factors that indicate prognosis of less than 6 months:  Weight loss: patient has lost over 20 lbs in the past 3 months, severe  "malnutrition diagnosed during this hospitalization   Functional decline: sleeping most of the day, needs assistance with all cares   Other significant changes in last 6 months    No current outpatient prescriptions on file.    Patient Active Problem List:     Septic shock (H)     Aspiration pneumonia (H) - suspected     Dysphagia     Lactic acidosis     Hyperglycemia     Protein-calorie malnutrition (H)     Acute cystitis with hematuria     Anemia     Hypocalcemia     Hypokalemia     Persistent atrial fibrillation (H)     Severe malnutrition (H)    This patient is under my care, and I have initiated the establishment of the plan of care. This patient will be followed by a physician who will periodically review the plan of care.    Physician/Provider to provide follow up care: Hospice Physician     Responsible PECOS certified Physician at time of discharge: Neelima Villafuerte MD    Please be aware that coverage of these services is subject to the terms and limitations of your health insurance plan.  Call member services at your health plan with any benefit or coverage questions.                  Pending Results     No orders found from 9/30/2018 to 10/3/2018.            Statement of Approval     Ordered          10/08/18 1402  I have reviewed and agree with all the recommendations and orders detailed in this document.  EFFECTIVE NOW     Approved and electronically signed by:  Socrates Chang MD             Admission Information     Date & Time Provider Department Dept. Phone    10/2/2018 Neelima Villafuerte MD [address] [facility] [phone]      Your Vitals Were     Blood Pressure Pulse Temperature Respirations Height Weight    105/73 70 96.6  F (35.9  C) (Oral) 18 1.727 m (5' 8\") 48.3 kg (106 lb 7.7 oz)    Pulse Oximetry BMI (Body Mass Index)                97% 16.19 kg/m2          Care EveryWhere ID     This is your Care EveryWhere ID. This could be used by other organizations to " access your [facility] medical records  [id number]        Equal Access to Services     AIDEEADRIANA JULIANA : Hadii aad ku hadkatiemikki Sodana, waaxda luqadaha, qaybta kavonnieda joellekorinasonal, papa gordon greermarion reedbensongatito penn. So [facility] [phone].    ATENCIÓN: Si habla español, tiene a lucas disposición servicios gratuitos de asistencia lingüística. Llame al [phone].    We comply with applicable federal civil rights laws and [place] laws. We do not discriminate on the basis of race, color, national origin, age, disability, sex, sexual orientation, or gender identity.               Review of your medicines      START taking        Dose / Directions    amoxicillin-clavulanate 400-57 MG/5ML suspension   Commonly known as:  AUGMENTIN   Indication:  Pneumonia caused by Inhaling a Substance Into the Lungs        Dose:  500 mg   Take 6.3 mLs (500 mg) by mouth 3 times daily for 4 days   Quantity:  75.6 mL   Refills:  0            Where to get your medicines      These medications were sent to [facility] [place], [place] - [address] [facility]   [address] [facility] [address]     Phone:  [phone]     amoxicillin-clavulanate 400-57 MG/5ML suspension                Protect others around you: Learn how to safely use, store and throw away your medicines at www.disposemymeds.org.        ANTIBIOTIC INSTRUCTION     You've Been Prescribed an Antibiotic - Now What?  Your healthcare team thinks that you or your loved one might have an infection. Some infections can be treated with antibiotics, which are powerful, life-saving drugs. Like all medications, antibiotics have side effects and should only be used when necessary. There are some important things you should know about your antibiotic treatment.      Your healthcare team may run tests before you start taking an antibiotic.    Your team may take samples (e.g., from your blood, urine or other areas) to run tests to look for bacteria. These test can be important  to determine if you need an antibiotic at all and, if you do, which antibiotic will work best.      Within a few days, your healthcare team might change or even stop your antibiotic.    Your team may start you on an antibiotic while they are working to find out what is making you sick.    Your team might change your antibiotic because test results show that a different antibiotic would be better to treat your infection.    In some cases, once your team has more information, they learn that you do not need an antibiotic at all. They may find out that you don't have an infection, or that the antibiotic you're taking won't work against your infection. For example, an infection caused by a virus can't be treated with antibiotics. Staying on an antibiotic when you don't need it is more likely to be harmful than helpful.      You may experience side effects from your antibiotic.    Like all medications, antibiotics have side effects. Some of these can be serious.    Let you healthcare team know if you have any known allergies when you are admitted to the hospital.    One significant side effect of nearly all antibiotics is the risk of severe and sometimes deadly diarrhea caused by Clostridium difficile (C. Difficile). This occurs when a person takes antibiotics because some good germs are destroyed. Antibiotic use allows C. diificile to take over, putting patients at high risk for this serious infection.    As a patient or caregiver, it is important to understand your or your loved one's antibiotic treatment. It is especially important for caregivers to speak up when patients can't speak for themselves. Here are some important questions to ask your healthcare team.    What infection is this antibiotic treating and how do you know I have that infection?    What side effects might occur from this antibiotic?    How long will I need to take this antibiotic?    Is it safe to take this antibiotic with other medications or  supplements (e.g., vitamins) that I am taking?     Are there any special directions I need to know about taking this antibiotic? For example, should I take it with food?    How will I be monitored to know whether my infection is responding to the antibiotic?    What tests may help to make sure the right antibiotic is prescribed for me?      Information provided by:  www.cdc.gov/getsmart  U.S. Department of Health and Human Services  Centers for disease Control and Prevention  National Center for Emerging and Zoonotic Infectious Diseases  Division of Healthcare Quality Promotion             Medication List: This is a list of all your medications and when to take them. Check marks below indicate your daily home schedule. Keep this list as a reference.      Medications           Morning Afternoon Evening Bedtime As Needed    amoxicillin-clavulanate 400-57 MG/5ML suspension   Commonly known as:  AUGMENTIN   Take 6.3 mLs (500 mg) by mouth 3 times daily for 4 days   Last time this was given:  500 mg on 10/8/2018  8:23 AM

## 2018-10-02 NOTE — PROGRESS NOTES
SUBJECTIVE:  Brady Santiago, a 89 year old male scheduled an appointment to discuss the following issues:    Other dysphagia Ongoing dysphagia and declining status for several months. Now very weak and bp low.  Family states he chokes when he tries to eat or swallow.  He has no PCP, he has not seen a doctor for years  He does not c/o pain  His condition today is not acute, according to family.    No past medical history on file.  No current outpatient prescriptions on file.         EXAM:  bp 60's  General: awake but appears slightly lethargic. Responds to voice. Very thin, appears chronically ill.  LUNGS:  Clear to auscultation.   HEART:  Sl irregular rhythm, tones somewhat distant, rate 90's  ABDOMEN:  The abdomen is soft, nontender,   EXTREMITIES: no edema.     IMPRESSION/PLAN:  Encounter Diagnosis   Name Primary?     Other dysphagia       Cachexia    Hypotension, dehydration     PLAN:  Discussed with ED and  transfered     No orders of the defined types were placed in this encounter.      Gerald Houston

## 2018-10-02 NOTE — ED PROVIDER NOTES
History     Chief Complaint   Patient presents with     Hypotension     The history is provided by the spouse, a relative and the patient.     Brady Santiago is a 89 year old male who presents to the emergency department for hypotension. Patient brought to the ED by his wife, and 2 daughters. His family gave most of the information and history. He was originally brought into the [facility] for choking while trying to eat food and declining over the past month. His family states he has not been eating or drinking as much, unable to get out of bed, has been weaker with no energy. They state he did have a recent fall, however, no trauma with the fall. His short term memory has been getting worse. He has had diarrhea recently also. Patient's family denies any confusion, vomiting or pain. They state he does not use a walker at home. He is not a smoker.    Problem List:    There are no active problems to display for this patient.       Past Medical History:    History reviewed. No pertinent past medical history.    Past Surgical History:    History reviewed. No pertinent surgical history.    Family History:    No family history on file.    Social History:  Marital Status:   [2]  Social History   Substance Use Topics     Smoking status: Never Smoker     Smokeless tobacco: Never Used     Alcohol use No        Medications:      No current outpatient prescriptions on file.      Review of Systems   Constitutional: Positive for appetite change (decreased food and drink).   Respiratory: Positive for choking (while eating food).    All other systems reviewed and are negative.      Physical Exam   BP: (!) 75/52  Resp: 20  Weight: 45.9 kg (101 lb 2 oz)      Physical Exam   Constitutional: No distress.   Very thin appearing male lying on the bed in no acute distress.   HENT:   Head: Atraumatic.   Mouth/Throat: Oropharynx is clear and moist. No oropharyngeal exudate.   Very malodorous breath. Small bruise to lower  "lip.   Eyes: Pupils are equal, round, and reactive to light. No scleral icterus.   Cardiovascular: Normal heart sounds and intact distal pulses.    Distant heart sounds   Pulmonary/Chest: Breath sounds normal. No respiratory distress.   Course lung sounds   Abdominal: Soft. Bowel sounds are normal. There is no tenderness.   Musculoskeletal: He exhibits no edema or tenderness.   Neurological:   Oriented to self and place.   Skin: Skin is warm. No rash noted. He is not diaphoretic.   Nursing note and vitals reviewed.      ED Course     ED Course     Central line  Date/Time: 10/2/2018 8:56 PM  Performed by: MATHEUS MCCLURE  Authorized by: MATHEUS MCCLURE   Consent: Verbal consent obtained. Written consent obtained.  Risks and benefits: risks, benefits and alternatives were discussed  Consent given by: patient and spouse  Patient identity confirmed: verbally with patient and arm band  Time out: Immediately prior to procedure a \"time out\" was called to verify the correct patient, procedure, equipment, support staff and site/side marked as required.  Indications: vascular access  Anesthesia: local infiltration    Anesthesia:  Anesthetic total: 1 mL    Sedation:  Patient sedated: no  Preparation: skin prepped with chlorhexidine  Location details: right subclavian  Patient position: Trendelenburg  Catheter type: triple lumen  Pre-procedure: landmarks identified  Ultrasound guidance: yes  Number of attempts: 1  Successful placement: yes  Post-procedure: line sutured and dressing applied  Assessment: blood return through all parts,  free fluid flow and placement verified by x-ray  Patient tolerance: Patient tolerated the procedure well with no immediate complications  Comments: Supervised and assisted by Dr. Garcia             EKG Interpretation:      Interpreted by Matheus Mcclure  Time reviewed: 1630  Symptoms at time of EKG: Hypotensive, Tachycardic, decreased mentation   Rhythm: unable to " differentiate, poor quality  Rate: 113  Axis: Normal  Ectopy: none  Conduction: right bundle branch block (incomplete)  ST Segments/ T Waves: unable to clearly identify  Q Waves: nonspecific  Comparison to prior: No old EKG available  Clinical Impression: non-specific EKG      Results for orders placed or performed during the hospital encounter of 10/02/18 (from the past 24 hour(s))   CBC with platelets differential   Result Value Ref Range    WBC 14.2 (H) 4.0 - 11.0 10e9/L    RBC Count 3.91 (L) 4.4 - 5.9 10e12/L    Hemoglobin 12.2 (L) 13.3 - 17.7 g/dL    Hematocrit 37.7 (L) 40.0 - 53.0 %    MCV 96 78 - 100 fl    MCH 31.2 26.5 - 33.0 pg    MCHC 32.4 31.5 - 36.5 g/dL    RDW 17.4 (H) 10.0 - 15.0 %    Platelet Count 396 150 - 450 10e9/L    Diff Method Automated Method     % Neutrophils 79.8 %    % Lymphocytes 12.4 %    % Monocytes 5.8 %    % Eosinophils 0.6 %    % Basophils 0.3 %    % Immature Granulocytes 1.1 %    Nucleated RBCs 0 0 /100    Absolute Neutrophil 11.4 (H) 1.6 - 8.3 10e9/L    Absolute Lymphocytes 1.8 0.8 - 5.3 10e9/L    Absolute Monocytes 0.8 0.0 - 1.3 10e9/L    Absolute Basophils 0.0 0.0 - 0.2 10e9/L    Abs Immature Granulocytes 0.2 0 - 0.4 10e9/L    Absolute Nucleated RBC 0.0    Comprehensive metabolic panel   Result Value Ref Range    Sodium 138 133 - 144 mmol/L    Potassium 4.3 3.4 - 5.3 mmol/L    Chloride 103 94 - 109 mmol/L    Carbon Dioxide 20 20 - 32 mmol/L    Anion Gap 15 (H) 3 - 14 mmol/L    Glucose 221 (H) 70 - 99 mg/dL    Urea Nitrogen 22 7 - 30 mg/dL    Creatinine 1.08 0.66 - 1.25 mg/dL    GFR Estimate 64 >60 mL/min/1.7m2    GFR Estimate If Black 77 >60 mL/min/1.7m2    Calcium 8.6 8.5 - 10.1 mg/dL    Bilirubin Total 1.0 0.2 - 1.3 mg/dL    Albumin 2.4 (L) 3.4 - 5.0 g/dL    Protein Total 7.1 6.8 - 8.8 g/dL    Alkaline Phosphatase 61 40 - 150 U/L    ALT 14 0 - 70 U/L    AST 20 0 - 45 U/L   Troponin I   Result Value Ref Range    Troponin I ES 0.026 0.000 - 0.045 ug/L   CRP inflammation   Result  Value Ref Range    CRP Inflammation 60.4 (H) 0.0 - 8.0 mg/L   Lactic acid whole blood   Result Value Ref Range    Lactic Acid 8.2 (HH) 0.7 - 2.0 mmol/L   Blood gas venous   Result Value Ref Range    Ph Venous 7.25 (L) 7.32 - 7.43 pH    PCO2 Venous 43 40 - 50 mm Hg    PO2 Venous 18 (L) 25 - 47 mm Hg    Bicarbonate Venous 19 (L) 21 - 28 mmol/L    Base Deficit Venous 8.0 mmol/L    FIO2 21    XR Chest Port 1 View    Narrative    CHEST ONE VIEW  10/2/2018 5:43 PM     HISTORY: Choking on food, coarse lung sounds throughout.      COMPARISON: None.      Impression    IMPRESSION: Patchy infiltrates in the periphery of the right lung and  at the left base.     GEETHA MAHONEY MD   XR Chest Port 1 View    Narrative    CHEST PORTABLE ONE VIEW     10/2/2018 8:17 PM     HISTORY: Post subclavian central line placement.      COMPARISON: Earlier the same day.      Impression    IMPRESSION: New right-sided central venous catheter tip projects over  the upper SVC. No pneumothorax visualized. Left basilar opacity and  diffuse interstitial opacities greatest in the right lung apex again  noted.     JUSTIN DUNLAP MD       Medications   norepinephrine (LEVOPHED) 16 mg in D5W 250 mL infusion (0.13 mcg/kg/min × 45.9 kg Intravenous Rate/Dose Change 10/2/18 2029)   sodium chloride 0.9% infusion ( Intravenous New Bag 10/2/18 2032)   piperacillin-tazobactam (ZOSYN) intermittent infusion 4.5 g (0 g Intravenous Stopped 10/2/18 2028)   0.9% sodium chloride BOLUS (0 mLs Intravenous Stopped 10/2/18 1742)   0.9% sodium chloride BOLUS (0 mLs Intravenous Stopped 10/2/18 2026)   cefTRIAXone (ROCEPHIN) intermittent infusion 1 g (0 g Intravenous Stopped 10/2/18 1747)       The patient has signs of Septic Shock as evidenced by:    1. Presence of Sepsis, AND  2. Lactic Acid level greater than or equal to 4 and Persistent hypotension defined by the last 2 BP readings within the ONE HOUR follwing completion of the 30mL/kg bolus being low (SBP <90 or MAP  <65)    Time septic shock diagnosis confirmed = 1558 as this was the time when Persistent Hypotension present (2 consecutive SBP <90 or MAP <65 within ONE hour after 30cc/kg IVF bolus completed)      3 Hour Septic Shock Bundle Completion:  1. Initial Lactic Acid Result:   Recent Labs   Lab Test  10/02/18   1535   LACT  8.2*     2. Blood Cultures before Antibiotics: Yes  3. Broad Spectrum Antibiotics Administered: Yes     Anti-infectives (Future)    Start     Dose/Rate Route Frequency Ordered Stop    10/02/18 1801  piperacillin-tazobactam (ZOSYN) intermittent infusion 4.5 g      4.5 g  over 1 Hours Intravenous EVERY 6 HOURS 10/02/18 1800          4. 2000 ml of IV fluids.      Assessments & Plan (with Medical Decision Making)  Brady is a 89-year-old male, presents from home, directed from the clinic for evaluation of his hypotension and ongoing decline over the last 30 days.  Please refer to HPI and focused exam.  Patient on arrival here is significantly hypertensive with systolic pressure ranging anywhere from 50-75 systolic.  Bilateral peripheral IVs were established and 2 L of normal saline were initiated.  Patient is significantly tachycardic with a heart rate of 130.  He is alert and oriented and able to converse with his hypotension.  Patient is not in any acute respiratory distress and oxygen saturation is 100% on room air.  Patient's temperature is slightly low at 96.4 rectal.  Concerns for dehydration versus sepsis in light of patient's significant hypotension and tachycardia.  Blood work was obtained and does return with a leukocytosis of 14.2 with a left shift, hemoglobin is stable at 12.2.  Surprisingly patient's comprehensive metabolic panel returns with a glucose of 221 but is otherwise fairly unremarkable, anion gap is barely elevated at 15 and albumin is 2.4.  Patient's kidney function is intact with a creatinine of 1.08 and a GFR of 64.  EKG is negative for any acute ischemic findings.  Troponin is  negative at 0.026.  I did order a straight cath on patient, nursing was unable to pass the past patient's prostate.  I attempted this with a coudé catheter and was also unsuccessful.  Patient is uncircumcised, I am unable to fully retract his foreskin but just enough to evaluate urinary meatus and ensure that the catheter was being placed in the right location.  Patient does have an ulceration/wound to the dorsal aspect of his penis.  There is no surrounding erythema or current drainage.  Catheter attempt was aborted and patient had a U bag placed for urine collection as he is incontinent.  CRP returns at 60.4 with a significant lactic acid at 8.2.  Venous blood gas returns with a pH of 7.25, PCO2 of 18 and a bicarb of 19 consistent with sepsis and acidosis.  Patient's workup today is consistent with septic shock.  Patient's source was uncertain, I did order him a dose of Rocephin for broad-spectrum coverage.  Unfortunately there was time delay with regard to patient's lactic acid and IV antibiotics as I was with a critical patient who is requiring airway management/intervention, lactic acid returned at 1558 with antibiotic started at 1707.  Chest x-ray was obtained on patient does show patchy infiltrates in the periphery of the right lung and in the left base.  Given his choking history or inability to swallow solid food history I am concerned that this may be aspiration related and Zosyn was initiated to cover for possible aspiration pneumonia.  Patient remained hypotensive throughout his emergency department stay, he was started on levophed, this was titrated up to eventually a pressure of 106 systolic.  Central line was placed in the right subclavian, patient's IJ was not conducive to central line placement given his anatomy.  Please see procedure note.  Central line placement was confirmed by x-ray, there is no evidence of pneumothorax.  Patient was admitted to the intensive care unit under the care of   Sravanthi for ongoing management of his septic shock.  Patient's urine did return with an infection, culture is pending.  Dr. Fishman made aware.  Patient and family are updated on plan of care, questions were answered and patient was admitted in serious condition.  Patient was staffed in the emergency department with Dr. Gross.      I have reviewed the nursing notes.    I have reviewed the findings, diagnosis, plan and need for follow up with the patient.    New Prescriptions    No medications on file       Final diagnoses:   Septic shock (H)   Aspiration pneumonia of left lower lobe, unspecified aspiration pneumonia type (H) - and right   Urinary tract infection in male     This document serves as a record of services personally performed by Luz Maria Ashford CNP. It was created on their behalf by Lisa Mares, a trained medical scribe. The creation of this record is based on the provider's personal observations and the statements of the patient. This document has been checked and approved by the attending provider.    Note: Chart documentation done in part with Dragon Voice Recognition software. Although reviewed after completion, some word and grammatical errors may remain.    10/2/2018   [facility] EMERGENCY DEPARTMENT     Samaria Ashford, CHIDI CNP  10/02/18 1965

## 2018-10-03 PROBLEM — D64.9 ANEMIA: Status: ACTIVE | Noted: 2018-01-01

## 2018-10-03 PROBLEM — E83.51 HYPOCALCEMIA: Status: ACTIVE | Noted: 2018-01-01

## 2018-10-03 NOTE — PROGRESS NOTES
Calcium ionized whole blood done at 1400 still low at 4.2, pt blood occult stool negative, pink slip sent to Chitra charge nurse to update Dr. Sheffield.

## 2018-10-03 NOTE — PROGRESS NOTES
[facility]    Hospitalist Progress Note    Date of Service (when I saw the patient): 10/03/2018    Assessment & Plan     Brady Santiago is a 89 year old male who was admitted on 10/2/2018.     Active Problems:    Septic shock (H)    Assessment: overall seems to be improving.  His lactic acidosis has resolved and he is no longer tachycardic and WBC has normalized.  Mentation remains normal but his BP remains low.  His baseline BP is unknown and he may run low considering his body habitus and weight loss.  He continues to be surprisingly asymptomatic.  His urine suggests a UTI and there is ongoing concern for possible aspiration pneumonia based on his history. Still needing levophed to keep his MAP over 65.     Plan: repeat fluid bolus, wean levophed as able. Continue zosyn and zithromax for now.  Follow VS and WBC.  Monitor BC and UC results      Aspiration pneumonia (H) - suspected    Assessment: no cough or SOB and good sats on RA. No increased work of breathing.  Overall seems to be improving    Plan: no change      Dysphagia    Assessment: has been chronic according to his family    Plan: continue NPO for now and then await speech input      Lactic acidosis    Assessment: resolved    Plan: no need to follow      Acute cystitis with hematuria    Assessment: asymptomatic.  He did have hematuria which could be from the UTI but will need to be repeated to make sure the hematuria resolves with treatment    Plan: continue zosyn and continue to monitor UC and BC results      Hyperglycemia    Assessment: no recurrence and hgba1c non diabetic range    Plan: will stop blood sugar checks      Protein-calorie malnutrition (H)    Assessment: poor oral intake due to lack of appetite    Plan: nutrition consult      Anemia    Assessment: suspect this is due to poor nutrition    Plan: check FOB, iron studies, b12 and folic acid.  Recheck tomorrow am        DVT Prophylaxis: Pneumatic Compression  Devices  Code Status: DNR/DNI    Disposition: Expected discharge in 2 days once sepsis resolves.    Cade Fishman MD    Interval History   Denies SOB or cough.  Denies abdominal pain.  Denies urinary symptoms. No chills or sweats    -Data reviewed today: I reviewed all new labs and imaging results over the last 24 hours. I personally reviewed no images or EKG's today.    Physical Exam   Temp: 97.5  F (36.4  C) Temp src: Oral BP: 98/73 Pulse: 80 Heart Rate: 68 Resp: 21 SpO2: 96 % O2 Device: None (Room air)    Vitals:    10/02/18 1518 10/02/18 2138   Weight: 45.9 kg (101 lb 2 oz) 45.3 kg (99 lb 13.9 oz)     Vital Signs with Ranges  Temp:  [96.4  F (35.8  C)-98  F (36.7  C)] 97.5  F (36.4  C)  Pulse:  [] 80  Heart Rate:  [] 68  Resp:  [0-37] 21  BP: ()/(32-96) 98/73  SpO2:  [85 %-100 %] 96 %       Lungs:  CTA auscultation throughout        Cardiovascular:   RRR with no murmur, rub or gallop     Abdomen:   +BS.  Soft, NT         Medications     norepinephrine 0.13 mcg/kg/min (10/03/18 0300)     sodium chloride 10 mL/hr at 10/03/18 0602     sodium chloride 125 mL/hr at 10/02/18 2206       azithromycin  500 mg Intravenous Q24H     heparin lock flush  5-10 mL Intracatheter Q24H     piperacillin-tazobactam  3.375 g Intravenous Q6H       Data     Recent Labs  Lab 10/03/18  0520 10/02/18  1535   WBC 7.9 14.2*   HGB 9.1* 12.2*   MCV 95 96    396    138   POTASSIUM 3.6 4.3   CHLORIDE 112* 103   CO2 21 20   BUN 17 22   CR 0.81 1.08   ANIONGAP 9 15*   AVA 6.8* 8.6   GLC 85 221*   ALBUMIN  --  2.4*   PROTTOTAL  --  7.1   BILITOTAL  --  1.0   ALKPHOS  --  61   ALT  --  14   AST  --  20   TROPI  --  0.026       Recent Results (from the past 24 hour(s))   XR Chest Port 1 View    Narrative    CHEST ONE VIEW  10/2/2018 5:43 PM     HISTORY: Choking on food, coarse lung sounds throughout.      COMPARISON: None.      Impression    IMPRESSION: Patchy infiltrates in the periphery of the right lung  and  at the left base.     GEETHA MAHONEY MD   XR Chest Port 1 View    Narrative    CHEST PORTABLE ONE VIEW     10/2/2018 8:17 PM     HISTORY: Post subclavian central line placement.      COMPARISON: Earlier the same day.      Impression    IMPRESSION: New right-sided central venous catheter tip projects over  the upper SVC. No pneumothorax visualized. Left basilar opacity and  diffuse interstitial opacities greatest in the right lung apex again  noted.     JUSTIN DUNLAP MD

## 2018-10-03 NOTE — PLAN OF CARE
Problem: Patient Care Overview  Goal: Plan of Care/Patient Progress Review  Outcome: Therapy, progress toward functional goals is gradual  Pt alert/confused, sleepy at times, pt too weak to get out of bed today, SCD's on, pt denies pain and appears comfortable, appeared slightly more restless this afternoon and told his daughters he was ready to go home, VSS, tried to wean down Levophed x 2 this am and pressures/MAPS were not maintinaing, Levophed weaned down to 0.05 at 1540 and b/p's/MAPS maintaining, pt has frequency with urination and goes about 50cc at a time, urine dark/concentrated/odorous, PV bladder scanned showed 75cc, pt received calcium gluconate x2 for low ionized calcium levels, telemetry showing a-fib/BBB, above 95% on RA, one small stool today, stool blood occult negative, normal saline 0.9% continuous at 125/hr, poor appetite, pt only takes small bites and then refuses to eat any more, CVP started around 11am, CVP 5-8, pt diet NDD1, honeythick liquids/supplements, video swallow planned for tomorrow, Zithromax and zosyn for antibiotics, LS clear/diminished. Wife Angelina here with 2 daughters most of the day visiting, social work consult order put in today.

## 2018-10-03 NOTE — PROGRESS NOTES
Calcium ionized whole blood lab result was 4.3, pt was replaced with calcium gluconate x 2 today, pink slip sent to charge nurse Chitra to let Dr. Sheffield know.

## 2018-10-03 NOTE — PLAN OF CARE
Discharge Planner SLP   Patient plan for discharge: home with assistance  Current status: Patient presents with moderate oropharyngeal dysphagia characterized by poor AP movement of bolus and probable loss of bolus over base of tongue prior to initiation of the swallow.  He also presents with evidence of pharyngeal residual and suspect possible penetration/aspiration with thin and nectar liquid consistencies.  Recommend diet of honey thick liquids and puteed foods, pills crushed and placed in applesauce or yogurt, bites to be less than 1 teaspoon in size, patient to be upright to 90 degrees for all oral intake and remain up for at least 30 minutes following intake to allow time for spontaneous clearance of oral and pharyngeal cavity, no straws, oral cares to be completed after meals.  Also recommend the VFSS be completed to fully assess function of the swallowing mechanism and rule out aspiration and determine safest and least restrictive diet.    Barriers to return to prior living situation: increased risk of aspiration and pneumonia  Recommendations for discharge: diet of honey thick liquids, pureed foods, pills crushed and in applesauce.  Patient to be upright for all oral intake, small bites and sips, remain up at least 30 minutes following intake and oral cares after meals.    Rationale for recommendations: to maximize safety of oral intake.         Entered by: Conchita Roach 10/03/2018 9:24 AM

## 2018-10-03 NOTE — PROGRESS NOTES
Speech santiago done this am, they stated he needs to have pureed food diet, supplements, and honey thick liquids. Special instructions to keep pt sitting up 30 min after meals, no straws, and to give small bites to avoid pt from choke ing. Also stated pt need video swallow which can get done tomorrow, need order. Sumrall slip sent to charge nurse Chitra and MD.

## 2018-10-03 NOTE — H&P
[facility]    History and Physical  Hospitalist       Date of Admission:  10/2/2018  Date of Service (when I saw the patient): 10/02/18    Assessment & Plan       Active Problems:    Septic shock (H)    Assessment: presents with cough, history of choking while eating, bilateral infiltrates on his CXR along with leucocytosis, significant hypotension unresponsive to 30 ml/kg of fluids and an elevated lactic acid all consistent with septic shock.  The source is felt to be pneumonia and it could be aspiration based on history or CAP. He is at high risk of worsening sepsis or death without aggressive intervention and will need admission to the ICU.     Plan: admit to ICU, repeat fluid bolus, follow lactic acid, central line for levophed, titrate levophed to maintain MAP of 65, BC done, check UA, cover with zosyn and zithromax for now      Aspiration pneumonia (H) - suspected    Assessment: family describes choking every time he eats.  Patient denies this.  He denies any pain with swallowing.      Plan: as above.  Monitor pulse ox and use oxygen if needed      Dysphagia    Assessment: noted by family    Plan: NPO for now, speech therapy evaluation      Lactic acidosis    Assessment: due to sepsis    Plan: as above      Hyperglycemia    Assessment: incidental finding on CT.  No previous history of DM and no FH of diabetes.    Plan: follow BS, cover with novolog, check hgba1c.  Would also have a low threshold for getting a CT of his abdomen to make sure he doesn't have a pancreatic mass in light of his weight loss      Protein-calorie malnutrition (H)    Assessment: patient states he is just not hungry.  Denies any nausea or pain with eating    Plan: nutrition consult          DVT Prophylaxis: Pneumatic Compression Devices  Code Status: DNR / DNI - discussed with he and his family today    Disposition: Expected discharge in 3-4 days once sepsis resolved.    Cade Fishman MD    Primary  Care Physician   Physician No Ref-Primary    Chief Complaint   Weak    History is obtained from the patient and patient's family    History of Present Illness   Brady Santiago is a 89 year old male who presents with weakness.  He has been noticed by family to be coughing for the last two weeks.  They also notice he has been eating poorly and every time he tries to eat he ends up coughing and choking on his food.  He has had some problems with short term memory but he has not been confused.  He denies SOB, fever, chills, or sweats. He denies any urinary symptoms.  He has intermittent problems with constipation but no diarrhea. He denies any skin sores but did scrape his shins recently from a fall but otherwise no injury. Due to his decline he was brought to the clinic and then sent to the ED due to severe hypotension.  He is now being admitted for management of sepsis as outlined above.    Past Medical History    I have reviewed this patient's medical history and updated it with pertinent information if needed.   Past Medical History:   Diagnosis Date     NO ACTIVE PROBLEMS        Past Surgical History   I have reviewed this patient's surgical history and updated it with pertinent information if needed.  Past Surgical History:   Procedure Laterality Date     FOOT SURGERY         Prior to Admission Medications   None     Allergies   No Known Allergies    Social History   I have reviewed this patient's social history and updated it with pertinent information if needed. Brady Santiago  reports that he has never smoked. He has never used smokeless tobacco. He reports that he does not drink alcohol or use illicit drugs.    Family History   I have reviewed this patient's family history and updated it with pertinent information if needed.   Family History   Problem Relation Age of Onset     Cancer No family hx of      Diabetes No family hx of      Coronary Artery Disease No family hx of        Review of Systems   The 10 point  Review of Systems is negative other than noted in the HPI or here.     Physical Exam   Temp: 96.4  F (35.8  C) (Luz Maria Ashford NP updated, no new orders) Temp src: Rectal BP: (!) 75/50 Pulse: 131 Heart Rate: 96 Resp: 17 SpO2: 97 % O2 Device: None (Room air)    Vital Signs with Ranges  Temp:  [96.4  F (35.8  C)-97.4  F (36.3  C)] 96.4  F (35.8  C)  Pulse:  [] 67  Heart Rate:  [] 96  Resp:  [13-37] 17  BP: ()/(32-92) 75/50  SpO2:  [85 %-100 %] 97 %  101 lbs 2 oz    Constitutional:   awake, alert, cooperative, appears disheveled and ill, and appears stated age     Eyes:   Lids and lashes normal, pupils equal, round and reactive to light, extra ocular muscles intact, sclera clear, conjunctiva normal     ENT:   Normocephalic, without obvious abnormality, atraumatic, sinuses nontender on palpation, external ears without lesions, oral pharynx with moist mucous membranes, tonsils without erythema or exudates, gums normal and good dentition.     Neck:   Supple, symmetrical, trachea midline, no adenopathy, thyroid symmetric, not enlarged and no tenderness, skin normal     Hematologic / Lymphatic:   no cervical lymphadenopathy and no supraclavicular lymphadenopathy     Back:   Symmetric, no curvature, spinous processes are non-tender on palpation, paraspinous muscles are non-tender on palpation, no costal vertebral tenderness     Lungs:   Mild increased work of breathing.  Rales audible in both bases but more on the left than right.  No wheezing.  Air movement is good     Cardiovascular:   Normal apical impulse, regular rate and rhythm, normal S1 and S2, no S3 or S4, and no murmur noted     Abdomen:   normal bowel sounds, soft, non-distended, non-tender, no masses palpated, no hepatosplenomegally     Neurologic:   Awake, alert, oriented to name, place and time.  Cranial nerves II-XII are grossly intact.  Motor is 5 out of 5 bilaterally to  strength and dorsi and plantar flexion of his feet. Sensory is  intact.       Skin:   Abrasions to both shins and a lesion on the dorsum of his penis       Data   Data reviewed today:  I personally reviewed no images or EKG's today.    Recent Labs  Lab 10/02/18  1535   WBC 14.2*   HGB 12.2*   MCV 96         POTASSIUM 4.3   CHLORIDE 103   CO2 20   BUN 22   CR 1.08   ANIONGAP 15*   AVA 8.6   *   ALBUMIN 2.4*   PROTTOTAL 7.1   BILITOTAL 1.0   ALKPHOS 61   ALT 14   AST 20   TROPI 0.026       Recent Results (from the past 24 hour(s))   XR Chest Port 1 View    Narrative    CHEST ONE VIEW  10/2/2018 5:43 PM     HISTORY: Choking on food, coarse lung sounds throughout.      COMPARISON: None.      Impression    IMPRESSION: Patchy infiltrates in the periphery of the right lung and  at the left base.     GEETHA MAHONEY MD

## 2018-10-03 NOTE — ED NOTES
ED Nursing criteria listed below was addressed during verbal handoff:     Abnormal vitals: Yes  Abnormal results: Yes  Med Reconciliation completed: Yes  Meds given in ED: Yes  Any Overdue Meds: No  Core Measures: Yes  Isolation: No  Special needs: No  Skin assessment: Yes    Observation Patient  Education provided: N/A

## 2018-10-03 NOTE — PLAN OF CARE
Problem: Patient Care Overview  Goal: Plan of Care/Patient Progress Review  Outcome: No Change  Patient is sleepy and disoriented to time, place, and situation. Patient denies any pain. Lung sounds are coarse in bilateral lower lobes. Patient is incontinent of urine and stool, but will impulsively get out of bed to use the restroom; assistance with urinal is provided. Patient is calm and cooperative. Blood sugars 88 and 95. Patient has Levophed running at 0.13 mcg/kg/min in central line and has not needed titration since 2030 in ED. Blood pressures have been 90s-low 100s over 60s-70s with MAPs in the 70s-90s. Vital signs are otherwise stable, afebrile. O2 sats mid to high 90%s on room air. Will continue to monitor.

## 2018-10-03 NOTE — ED NOTES
Care assumed from Ash HAYWOOD.  Pt remains hypotensive, Levophed has not been titrated up since initiated.  Will titrate as ordered.

## 2018-10-03 NOTE — PROGRESS NOTES
Tried to wean pt off levophed, pt blood pressures dropped shortly after adjusting levophed, pressure dropped 80's/50's, MAP 66, Levophed back to 0.13 and b/p's 100's/80's, MAPS 80's. Ionized calcium was 4.1 and iron labs appear low, pink slip sent to charge nurse Chitra to update Dr. Sheffield.

## 2018-10-03 NOTE — CONSULTS
"CLINICAL NUTRITION SERVICES  -  ASSESSMENT NOTE    RECOMMENDATIONS FOR MD/PROVIDER TO ORDER:   None   Recommendations Ordered by Registered Dietitian (RD):   Boost Plus at lunch and dinner   Future/Additional Recommendations:   None   Malnutrition:   Severe malnutrition in the context of acute on chronic illness     REASON FOR ASSESSMENT  Brady Santiago is a 89 year old male seen by Registered Dietitian for Provider Order - malnutrition.     NUTRITION HISTORY  Information obtained from chart, wife, and daughters. Presented to ED with hypotension. Per ED notes family reported that patient has not been eating/drinking as much, has been weak with no energy, and has had diarrhea. Per H&P patient reported he's just not hungry and has dysphagia. Patient's wife and daughters report that his appetite started to decrease 5 - 6 months ago, but has been significantly less the past 3 - 4 weeks. Specifically the past week he's only been eating bites of food. There is no weight history in patient's chart, but wife reports his highest weight was ~140 lbs and estimates that he weighed that ~2 years ago. Patient's wife and daughters report the patient has likely had a 10+ lb weight loss within the past month. Patient's wife does give patient Boost or Ensure at home.     CURRENT NUTRITION ORDERS  Diet Order:     Dysphagia Diet Level 1 Pureed Honey Thickened Liquids    Current Intake/Tolerance:  Minimal, eating bites of food    PHYSICAL FINDINGS  Observed  Muscle Wasting - moderate temporal wasting, severe clavicle wasting, severe scapular wasting, severe acromion process wasting  Poor dentition - dentures don't fit d/t wt loss  Obtained from Chart/Interdisciplinary Team  Very thin appearing     ANTHROPOMETRICS  Height: 5' 8\"  Weight: 101 lbs 13.64 oz  Body mass index is 15.49 kg/(m^2).  Weight Status:  Underweight BMI <18.5  IBW: 154 lbs  % IBW: 66%  Weight History:   Wt Readings from Last 10 Encounters:   10/03/18 46.2 kg (101 lb 13.6 " oz)   No weight hx in chart. Family reports 10 lb wt loss within the past month and that's 9% of his body weight.     LABS  Labs reviewed.    MEDICATIONS  Medications reviewed - levophed, NS at 125 mL/hr    ASSESSED NUTRITION NEEDS PER APPROVED PRACTICE GUIDELINES:  Dosing Weight 46 kg   Estimated Energy Needs: 1380 - 1610 kcals (30-35 Kcal/Kg)  Justification: repletion and underweight  Estimated Protein Needs: 55 - 69 grams protein (1.2-1.5 g pro/Kg)  Justification: repletion  Estimated Fluid Needs: 1 mL/kcal   Justification: maintenance    MALNUTRITION:  % Weight Loss:  > 5% in 1 month (severe malnutrition)  % Intake:  </= 50% for >/= 5 days (severe malnutrition)  Subcutaneous Fat Loss:  None observed  Muscle Loss:  Temporal region moderate depletion, Clavicle bone region severe depletion, Acromion bone region severe depletion and Scapular bone region severe depletion  Fluid Retention:  None noted    Malnutrition Diagnosis: Severe malnutrition  In Context of:  Acute illness on chronic illness    NUTRITION DIAGNOSIS:  Unintended weight loss related to decreased PO intake 2/2 decreased appetite and dysphagia as evidenced by 10 lb wt loss within the past month.     NUTRITION INTERVENTIONS  Recommendations / Nutrition Prescription  Will try Boost Plus at lunch and dinner  No other requests at this time per family, will continue to follow to see if appetite improves as patient feels better    Implementation  Nutrition education: No education needs assessed at this time  Medical Food Supplement    Nutrition Goals  PO intake to improve to >25% of meals within 24 hours.     MONITORING AND EVALUATION:  Progress towards goals will be monitored and evaluated per protocol and Practice Guidelines    Ella Amaya RDN, LD  Clinical Dietitian  [phone]

## 2018-10-03 NOTE — ED NOTES
Pt pulled out his peripheral IV in his right arm that the NS was infusing in.  Catheter is intact, there is a small hematoma at the insertion site.  Central line placement has not been confirmed yet, will hold on resuming NS until he arrives on the floor and they can infuse it in the central line.

## 2018-10-03 NOTE — PROGRESS NOTES
UA suggests UTI.  Will cover with zosyn for now while UC pending.  electronically signed by Cade Fishman M.D.

## 2018-10-03 NOTE — PROGRESS NOTES
10/03/18 0715   General Information   Onset Date 10/02/18   Start of Care Date 10/03/18   Referring Physician Cade Fishman MD   Patient Profile Review/OT: Additional Occupational Profile Info See Profile for full history and prior level of function   Patient/Family Goals Statement Per nursing notes, patient's family wanting patient to eat without coughing    Swallowing Evaluation Bedside swallow evaluation   Behaviorial Observations Alert   Mode of current nutrition NPO   Comments Patient was alert and particiapted well with the evaluation.  Nursing assisted with positioning patient upright in bed.  Patient was able to follow all directions and interacted with SLP in timely manner.  Overall comunication limited due to fatigue.   Clinical Swallow Evaluation   Oral Musculature generally intact   Structural Abnormalities none present   Dentition present and adequate   Mucosal Quality good   Mandibular Strength and Mobility impaired   Oral Labial Strength and Mobility impaired coordination   Lingual Strength and Mobility impaired coordination;impaired protrusion;impaired anterior elevation;impaired left lateral movement;impaired right lateral movement   Velar Elevation impaired   Buccal Strength and Mobility impaired   Laryngeal Function Cough;Throat clear;Swallow;Voicing initiated;Dry swallow palpated   Oral Musculature Comments mild-moderate decrease in range, rate, strength and coordination   Additional Documentation Yes   Additional evaluation(s) completed today No   Clinical Swallow Eval: Thin Liquid Texture Trial   Mode of Presentation, Thin Liquids spoon;fed by clinician   Volume of Liquid or Food Presented 2 ice chips, 2x 1/2 teaspoon via spoon   Oral Phase of Swallow Premature pharyngeal entry;Poor AP movement   Pharyngeal Phase of Swallow impaired;repeated swallows;throat clearing;no awareness of problems   Successful Strategies Trialed During Procedure chin luiza   Diagnostic Statement Difficulties  in oral and pharyngeal stage of the swallow as evidenced by throat clearing before the swallow and consistent throat clearing after the swallow across all trials   Clinical Swallow Eval: Nectar Thick Liquid Texture Trial   Mode of Presentation, Nectar spoon;fed by clinician   Volume of Nectar Presented 1.5 teaspoon (1st trial with half teaspoon and 2nd trials full teaspoon)   Oral Phase, [place] Poor AP movement   Pharyngeal Phase, Nectar impaired;no awareness of problems;repeated swallows;throat clearing   Successful Strategies Trialed During Procedure, Nectar hard swallow   Diagnostic Statement Difficulties in oral and pharyngeal stage of the swallow as evidenced by poor AP control, slightly delayed swallow and multiple spontaneous swallows following trial.     Clinical Swallow Eval: Honey Thick Liquid Texture Trial   Mode of Presentation, Honey spoon;fed by clinician;cup   Volume of Honey Presented 1 ounce over 3 trials   Oral Phase, Honey WFL   Pharyngeal Phase, Honey repeated swallows;no awareness of problems;impaired   Successful Strategies Trialed During Procedure, Honey hard swallow   Diagnostic Statement Mild difficulties managing oral phase but WNLs and no noted residual.  No coughing or throat clearing with honey thick liquid trials   Clinical Swallow Eval: Puree Solid Texture Trial   Mode of Presentation, Puree spoon;fed by clinician   Volume of Puree Presented .75 ounce over 2 trials   Oral Phase, Puree Poor AP movement   Pharyngeal Phase, Puree impaired;throat clearing;no awareness of problems   Successful Strategies Trialed During Procedure, Puree hard swallow   Diagnostic Statement Difficulties with AP movement, average 2 swallows noted per bolus and 2 throat clears per trial as evidence of pharyngeal residual   Swallow Compensations   Swallow Compensations Chin tuck;Effortful swallow;Reduce amounts;Pacing   Results Suspect aspiration;Other (see comments)  (suspect penetration)   General Therapy  Interventions   Planned Therapy Interventions Dysphagia Treatment   Dysphagia treatment Modified diet education;Instruction of safe swallow strategies;Compensatory strategies for swallowing;Oropharyngeal exercise training   Intervention Comments Patient would benefit from skilled intervention to determine safest diet level, identify safe swallow strategies, improve overall function of the swallowing mechanism and improve overall safety of oral intake and reduce risk of aspiration and penetration.   Swallow Eval: Clinical Impressions   Skilled Criteria for Therapy Intervention Skilled criteria met.  Treatment indicated.   Functional Assessment Scale (FAS) 3   Dysphagia Outcome Severity Scale (CHELSIE) Level 3 - CHELSIE   Treatment Diagnosis Moderate oropharyngeal dysphagia   Diet texture recommendations Dysphagia diet level 1;Honey thick liquids  (pills crushed and in applesauce or yogurt)   Recommended Feeding/Eating Techniques alternate between small bites and sips of food/liquid;check mouth frequently for oral residue/pocketing;maintain upright posture during/after eating for 30 mins;no straws;small sips/bites;hard swallow w/ each bite or sip  (allow time for additional swallows as needed)   Demonstrates Need for Referral to Another Service physical therapy;occupational therapy   Therapy Frequency 5 times/wk   Predicted Duration of Therapy Intervention (days/wks) LOS   Anticipated Discharge Disposition home w/ assist   Risks and Benefits of Treatment have been explained. Yes   Patient, family and/or staff in agreement with Plan of Care Yes   Clinical Impression Comments Patient presents with moderate oropharyngeal dysphagia characterized by poor AP movement of bolus and probable loss of bolus over base of tongue prior to initiation of the swallow.  He also presents with evidence of pharyngeal residual and suspect possible penetration/aspiration with thin and nectar liquid consistencies.  Recommend diet of honey thick  liquids and puteed foods, pills crushed and placed in applesauce or yogurt, bites to be less than 1 teaspoon in size, patient to be upright to 90 degrees for all oral intake and remain up for at least 30 minutes following intake to allow time for spontaneous clearance of oral and pharyngeal cavity, no straws, oral cares to be completed after meals.  Also recommend the VFSS be completed to fully assess function of the swallowing mechanism and rule out aspiration and determine safest and least restrictive diet.     Total Evaluation Time   Total Evaluation Time (Minutes) 30

## 2018-10-03 NOTE — PROGRESS NOTES
S-(situation): Patient arrives to room 217 via cart from ED    B-(background): Sepsis/Hypotension    A-(assessment): Patient is lethargic, but arouses to voice and is disoriented to place, time, and situation. Patient denies any pain. Patient has 2 small scabs on left shin and 4 small scabs on right shin, skin is otherwise dry, flaky, and intact. Patient is Levophed gtt at 0.13 upon admission with MAPs in the low 70s to mid 80s. Patient is otherwise vitally stable, afebrile. Blood glucose 88. Will continue to monitor.     R-(recommendations): Orders reviewed with patient. Will monitor patient per MD orders.     Inpatient nursing criteria listed below were met:    Health care directives status obtained and documented: Yes  SCD's Documented: Yes  Skin issues/needs documented:Yes  Isolation needs addressed, if appropriate: NA  Fall Prevention: Care plan updated, Education given and documented Yes  MRSA swab completed for patient 55 years and older (exclude ROBERTA and TKA): Yes  Care Plan initiated: Yes  Education Assessment documented:Yes  Education Documented (Pre-existing chronic infection such as, MRSA/VRE need education on admission): Yes  Admission Medication Reconciliation completed: Yes  New medication patient education completed and documented (Possible Side Effects of Common Medications handout): Yes  Home medications if not able to send immediately home with family stored here: NA  Reminder note placed in discharge instructions: NA  Discharge planning review completed (admission navigator) Yes

## 2018-10-04 PROBLEM — E87.6 HYPOKALEMIA: Status: ACTIVE | Noted: 2018-01-01

## 2018-10-04 NOTE — PROGRESS NOTES
Discharge Planner   Discharge Plans in progress: Home with family assist and home health care vs TCU  Barriers to discharge plan: Medical Stability   Follow up plan: CTS to follow       Entered by: Wilma Alvarado 10/04/2018 3:01 PM

## 2018-10-04 NOTE — PLAN OF CARE
Problem: Patient Care Overview  Goal: Plan of Care/Patient Progress Review  Discharge Planner PT   Patient plan for discharge: Home with wife  Current status: Patient is a 89 year old male, admitted from the ED with signs of septic shock. Patient found to have UTI, lactic acidosis, aspiration pneumonia, and dysphagia. Patient's family reports prior to admission patient choaking and coughing with decreased physical status over the past month. Prior to admission patient refusing to mobilize, transferring bed to commode with significant fatigue. Family reports using walker to mobilize patient when he is unable to. Patient with 4 falls in the past 6 months and family notes significant decline over the past 2 months. Currently, patient refused to mobilize out of bed for PT, rolled right and left with min assistance for surface to pull up on. Patient demonstrates impaired overhead reach and functional LE range of motion. Generalized weakness throughout and poor command following for formal strength test assessment. Per nursing patient earlier this date able to mobilize out of bed without assistance, demonstrates good standing balance and ambulated in room. Will continue to assess patient's mobility and activity tolerance in future sessions.   Barriers to return to prior living situation: Decreased activity tolerance, impaired functional mobility, medical status.   Recommendations for discharge: TCU  Rationale for recommendations: Patient refused to participate in PT evaluation. Information provided by patient's wife and two daughters. Given report of mobilization with patient's nurse earlier in the day and prior level of function as well as patient's age anticipate patient to make fair progress with PT and throughout his hospital stay. TCU placement is recommended to provide continued medical management as well as skilled therapeutic intervention to assist in progressing patient towards a safer level of functionand return  home with his wife.        Entered by: Kaetlin Juarez 10/04/2018 2:18 PM

## 2018-10-04 NOTE — PROGRESS NOTES
"   10/04/18 1400   General Information   Onset Date 10/02/18   Start of Care Date 10/03/18   Referring Physician Cade Fishman MD   Patient Profile Review/OT: Additional Occupational Profile Info See Profile for full history and prior level of function   Patient/Family Goals Statement \"We want what's best for him.\"   Swallowing Evaluation Videofluoroscopic evaluation   Behaviorial Observations Alert   Mode of current nutrition Oral diet   Type of oral diet Honey - thick liquid;Dysphagia diet level 1   Respiratory Status Room air   Comments Patient alert and participated WFLs for evaluation.  Seen sitting upright in chair.   VFSS Evaluation   VFSS Additional Documentation Yes   VFSS Eval: Radiology   Radiologist Dr. Aguayo   Views Taken left lateral   Physical Location of Procedure hospital, [address]   VFSS Eval: Nectar Thick Liquid Texture Trial   Mode of Presentation, Nectar spoon;fed by clinician   Order of Presentation 2, 3, 6   Preparatory Phase Poor bolus control   Oral Phase, Nectar Premature pharyngeal entry;Residue in oral cavity;Poor AP movement   Pharyngeal Phase, Nectar Pharyngeal wall coating;Residue in valleculae;Residue in pyriform sinus   Rosenbek's Penetration Aspiration Scale: Nectar-Thick Liquid Trial Results 2 - contrast enters airway, remains above the vocal cords, no residue remains (penetration)   Successful Strategies Trialed During Procedure, Nectar hard swallow;chin tuck   Diagnostic Statement penetration with nectar thick liquids; reduced pharyngeal stripping wave resulting in minimal pharyngeal clearance   VFSS Eval: Honey Thick Texture Trial   Mode of Presentation, Honey spoon;fed by clinician   Order of Presentation 1   Preparatory Phase WFL   Oral Phase, Honey Residue in oral cavity;Poor AP movement   Pharyngeal Phase, Honey Pharyngeal wall coating;Residue in valleculae;Residue in pyriform sinus;Delayed swallow reflex   Rosenbek's Penetration Aspiration Scale: Honey Trial Results " 1 - no aspiration, contrast does not enter airway   Diagnostic Statement no penetration or aspiration with honey thick liquid trials; significant residual throughout pharyngeal area due to decreased laryngeal elevation and diminished stripping wave resulting in minimal pharyngeal clearance   VFSS Eval: Puree Solid Texture Trial   Mode of Presentation, Puree spoon;fed by clinician   Order of Presentation 4, 5   Preparatory Phase Poor bolus control   Oral Phase, Puree Residue in oral cavity;Poor AP movement;Premature pharyngeal entry   Pharyngeal Phase, Puree Pharyngeal wall coating;Residue in valleculae;Residue in pyriform sinus   Rosenbek's Penetration Aspiration Scale: Puree Food Trial Results 1 - no aspiration, contrast does not enter airway   Diagnostic Statement no penetration or aspiration of pureed food trials; significant residual throughout pharyngeal area due to decreased laryngeal elevation and diminished stripping wave resulting in minimal pharyngeal clearance   Swallow Compensations   Swallow Compensations Chin tuck;Effortful swallow;Pacing;Reduce amounts;Multiple swallow;Alternate viscosity of consistencies   Results (ineffective in increasing efficiency of swallowing mechanism)   General Therapy Interventions   Planned Therapy Interventions Dysphagia Treatment   Dysphagia treatment Instruction of safe swallow strategies  (education of family regarding positioning for saliva managem)   Intervention Comments Due to severe oropharyngeal dysphagia oral intake is unsafe for patient at this time;  Recommend alternative means of nutrition at this time.  ST to follow up with provider and family regarding positional strategies and education regarding NPO    Swallow Eval: Clinical Impressions   Functional Assessment Scale (FAS) 1   Dysphagia Outcome Severity Scale (CHELSIE) Level 1 - CHELSIE   Treatment Diagnosis Severe orophayrgneal dysphagia   Diet texture recommendations NPO  (Recom. alternative means of nut; if  oral intake, honey/puree)   Demonstrates Need for Referral to Another Service /spiritual care;social work   Risks and Benefits of Treatment have been explained. Yes   Patient, family and/or staff in agreement with Plan of Care Yes   Clinical Impression Comments Patient presents with severe orophayrngeal dysphagia characterized by poor bolus control and significant residual throughout pharyngeal area due to decreased laryngeal elevation and diminished stripping wave resulting in minimal pharyngeal clearance.  Penetration of nectar thick liquids.  Even though no aspiration occurred with trials, due to severity of pharyngeal residual, recommend alternative means of nutrition.     Total Evaluation Time   Total Evaluation Time (Minutes) 25

## 2018-10-04 NOTE — PLAN OF CARE
Problem: Patient Care Overview  Goal: Plan of Care/Patient Progress Review  Discharge Planner SLP   Patient plan for discharge: to care facility  Current status: following results of VFSS and severe oropharyngeal dysphagia, recommend alternative means of nutrition at this time; recommend saliva management as needed and oral cares throughout the day   Barriers to return to prior living situation: generalized weakness  Recommendations for discharge: alternative means of nutrition; position for saliva management and oral cares throughout the day   Rationale for recommendations: reduce risk of aspiration and pneumonia       Entered by: Conchita Roach 10/04/2018 3:46 PM

## 2018-10-04 NOTE — PROGRESS NOTES
10/04/18 1350   Quick Adds   Type of Visit Initial PT Evaluation       Present no   Living Environment   Lives With spouse   Living Arrangements house   Home Accessibility bed and bath on same level;grab bars present (bathtub);tub/shower is not walk in;stairs (2 railings present);stairs to enter home   Number of Stairs to Enter Home 4  (Bilat hand rail)   Number of Stairs Within Home 0   Stair Railings at Home outside, present at both sides   Living Environment Comment Daughters present upon PT arrival. Provided historical infromation.    Self-Care   Dominant Hand right   Usual Activity Tolerance poor   Current Activity Tolerance poor   Equipment Currently Used at Home commode;walker, rolling;grab bar  (2WW)   Activity/Exercise/Self-Care Comment Patient has been sponge bathing for a while per daughter. 2 months ago able to get around on his own without AD. 1-2 months ago family noticed decline in mobility, going only to the bathroom and short distances.   Functional Level Prior   Ambulation 4-->completely dependent  (refused to mobilize. Walker used for MD apt transfer)   Transferring 2-->assistive person   Toileting 1-->assistive equipment   Bathing 3-->assistive equipment and person   Dressing 2-->assistive person   Eating 2-->assistive person   Communication 2-->difficulty understanding (not related to language barrier)   Swallowing 2-->difficulty swallowing liquids/foods   Cognition 2 - difficulty with organizing thoughts  (STM per family. LTM intact)   Fall history within last six months yes   Number of times patient has fallen within last six months 4  (no injury per wife, only brusing and laceration)   Which of the above functional risks had a recent onset or change? fall history;swallowing   Prior Functional Level Comment With recent decline patient requiring greater level of assistance. Family notes varying levels of effort and assistance at home   General Information   Onset of  Illness/Injury or Date of Surgery - Date 10/02/18   Referring Physician Dr. Villafuerte   Patient/Family Goals Statement Return home with wife per family   Pertinent History of Current Problem (include personal factors and/or comorbidities that impact the POC) Patient is a 89 year old male, admitted from the ED with signs of septic shock. Patient found to have UTI, lactic acidosis, aspiration pneumonia, and dysphagia. Patient's family reports prior to admission patient choaking and coughin with decreased physical status over the past month.    Precautions/Limitations fall precautions   Weight-Bearing Status - LUE full weight-bearing   Weight-Bearing Status - RUE full weight-bearing   Weight-Bearing Status - LLE full weight-bearing   Weight-Bearing Status - RLE full weight-bearing   General Observations Patient somnolent. Daughters present and supportive.    General Info Comments PT orders: eval and treat generalized weakness due to sepsis and assist with disposition planning. Activity orders: up with assistance.    Cognitive Status Examination   Cognitive Comment Patient unwilling to answer for PT.   Pain Assessment   Patient Currently in Pain No   Integumentary/Edema   Integumentary/Edema Comments Frail and thin skin throughout. Dry LE however no signs of vascular insufficiency.    Posture    Posture Forward head position;Protracted shoulders;Kyphosis   Range of Motion (ROM)   ROM Comment Bilat UE limited above 75 degrees active flexion, full elbow extension bilat. Bilat hip and knee flexion full. Decreased ankle DF bilaterally.   Strength   Strength Comments Patient unable to follow commands for assessment.    Bed Mobility   Bed Mobility Bed mobility skill: Rolling/Turning   Bed Mobility Comments Patient refused to mobilize out of bed, will assess in furture sessions.   Bed Mobility Skill: Rolling/Turning   Level of [place]: Rolling/Turning minimum assist (75% patients effort)  (surface to pull up on)    Physical/Nonphysical Assist: Rolling/Turning verbal cues;supervision   Transfer Skills   Transfer Comments Patient refused to mobilize. Per patient's nurse hs been up in the chair and walked in the room this morning.    Sensory Examination   Sensory Perception no deficits were identified   Muscle Tone   Muscle Tone Comments Generalized weakness with muscle atrophy throughout.   General Therapy Interventions   Planned Therapy Interventions bed mobility training;gait training;ROM;strengthening;home program guidelines   Clinical Impression   Criteria for Skilled Therapeutic Intervention yes, treatment indicated   PT Diagnosis Muscle weakness, joint stiffness, decreased muscular endurance   Influenced by the following impairments Acute medical status, decreased activity in the home   Functional limitations due to impairments Impaired functional mobility, decreased activity tolerance, decreased safety with transitional movement    Clinical Presentation Evolving/Changing   Clinical Presentation Rationale Per chart review, significant fatigue and lethargy with PT visit.    Clinical Decision Making (Complexity) Moderate complexity   Therapy Frequency` daily   Predicted Duration of Therapy Intervention (days/wks) 3-4 days   Anticipated Equipment Needs at Discharge (defer to TCU)   Anticipated Discharge Disposition Transitional Care Facility   Risk & Benefits of therapy have been explained Yes   Patient, Family & other staff in agreement with plan of care Yes   Clinical Impression Comments Patient refused to participate in PT evaluation. Information provided by patient's wife and two daughters. Given report of mobilization with patient's nurse earlier in the day and prior level of function as well as patient's age anticipate patient to make fair progress with PT and throughout his hospital stay. TCU placement is recommended to assist in progressing patient towards a safer level of functionand return home with his wife.   "  [facility] TM \"6 Clicks\"   2016, Trustees of [facility], under license to [employer].  All rights reserved.   6 Clicks Short Forms Basic Mobility Inpatient Short Form   [facility]  \"6 Clicks\" V.2 Basic Mobility Inpatient Short Form   1. Turning from your back to your side while in a flat bed without using bedrails? 3 - A Little   2. Moving from lying on your back to sitting on the side of a flat bed without using bedrails? 3 - A Little   3. Moving to and from a bed to a chair (including a wheelchair)? 3 - A Little   4. Standing up from a chair using your arms (e.g., wheelchair, or bedside chair)? 3 - A Little   5. To walk in hospital room? 3 - A Little   6. Climbing 3-5 steps with a railing? 1 - Total   Basic Mobility Raw Score (Score out of 24.Lower scores equate to lower levels of function) 16   Total Evaluation Time   Total Evaluation Time (Minutes) 15     Thank you for your referral.    Katelin Juarez, PT, DPT, ATC    [facility]    O: [phone]  E: [email or web address]      "

## 2018-10-04 NOTE — PROGRESS NOTES
SPIRITUAL HEALTH SERVICES  SPIRITUAL ASSESSMENT Progress Note  [facility]      During Rounding,  introduced himself to Brady Webster and informed him of his availability.    Grayson Sandoval M.Div., [facility]  Staff   Office tel: [phone]

## 2018-10-04 NOTE — CONSULTS
CARE TRANSITION SOCIAL WORK INITIAL ASSESSMENT:  Reason For Consult: discharge planning   Met with: Patient and Family.    DATA  Principal Problem:    Septic shock (H)  Active Problems:    Aspiration pneumonia (H) - suspected    Dysphagia    Lactic acidosis    Hyperglycemia    Protein-calorie malnutrition (H)    Acute cystitis with hematuria    Anemia    Hypocalcemia    Hypokalemia             Contact information and PCP information verified: No primary       ASSESSMENT  Cognitive Status: awake and oriented.             Lives With: spouse  Living Arrangements: house  Quality Of Family Relationships: supportive, helpful, stressful  Description of Support System: Supportive, Involved   Who is your support system?: Wife, Children   Support Assessment: Adequate family and caregiver support, Adequate social supports   Insurance Concerns: No Insurance issues identified        This writer met with pt, spouse, Angelina, and his two daughters Trish and Zena introduced self and role. Discussed discharge planning and medicare guidelines in regards to home care and SNF benefits. This writer spoke with family regarding his health after the hospital and what might benefit him. Pt was provided with Medicare certified home care list. Patient was provided with Medicare certified nursing home list. These were offered for possible options to the family if needed. Family also states that patient would be very angry if he had to be discharged to a TCU.  CTS will continue to follow.       PLAN  TCU vs Home with homecare      Wilma Alvarado  Social Work [facility] [phone]  [facility] [phone]

## 2018-10-04 NOTE — PROGRESS NOTES
[facility] Progress Note          Assessment and Plan:   Assessment:   Principal Problem:    Septic shock (H)    Assessment: Characterized by severe hypotension requiring pressors, elevated lactic acid, leukocytosis, and history of choking while eating with infiltrates bilaterally on his chest x-ray.  Patient continues to improve on Zosyn and azithromycin.  Has been off pressor support since 2000 yesterday with Blood pressures in the 110-120 systolic this morning.  Patient had some mild confusion overnight but has returned to baseline orientation this morning.  WBC has normalized, lactic acid is normal.  Patient is not needing any oxygen and has remained afebrile.      Plan: We will continue with Zosyn and azithromycin for antibiotic coverage, monitor patient's blood pressures closely and consider transitioning out of the ICU later today if he can remain off Levophed.  Continue work up of aspiration risk as below.      Active Problems:    Aspiration pneumonia (H) - suspected    Assessment: Suspected based on clinical history, patient improving with Zosyn and azithromycin as above    Plan: Proceed with treatment as above      Hypokalemia    Assessment:  Potassium today is 3.0, likely secondary to nutritional deficit complicated by aggressive fluid resuscitation needed during this hospitalization     Plan:  Replacement protocol initiated, will replace and monitor as needed      Dysphagia    Assessment: noted by family for months prior to this admission and seem to be worsening.  Speech therapy has been consulted and is recommending a honey thick liquids, pureed foods for now but recommends video swallow to further evaluate     Plan: Will proceed with video swallow study, follow recommended diet for now.       Lactic acidosis    Assessment: secondary to sepsis, now resolved    Plan: continue treatment of sepsis as above       Hyperglycemia    Assessment: one time occurrence of unclear etiology with  HgbA1C is  5.1.  Blood sugars have been in the 70-90 fasting     Plan: no further routine monitoring is needed unless concern for hypoglycemia develops       Protein-calorie malnutrition (H)    Assessment: ongoing poor oral intake secondary to decreased appetite and difficulty with swallowing    Plan: Nutritionist evaluation and recommendations are appreciated.  We will continue to encourage increased oral intake      Acute cystitis with hematuria    Assessment: suspected initially based on UA results however urine culture has shown no growth    Plan: Continue with treatment of sepsis as above, monitor urinary output closely      Anemia    Assessment: Workup reveals likely anemia of chronic disease etiology with hemoglobin stable in the mid 9 range following initial fluid resuscitation    Plan: continue to monitor for stability,       Hypocalcemia    Assessment: thought secondary to nutritional deficit, improved with ionized calcium now in the low normal range following supplementation  yesterday    Plan: Continue to monitor and supplement if needed.        # Pain Assessment:  Current Pain Score 10/4/2018   Patient currently in pain? partha Abreu s pain level was assessed and he currently denies pain.        Disposition Plan   Discharge Planning and Disposition    1.  Clinically stable enough to begin D/C disposition and planning: Yes      2.  Patient Goals for Discharge:      A.  Clinical - The following need to be achieved for discharge:  adequate fluid and/or nutritional intake, stable neurologic status and stable vital signs    B.  Diagnostic testing and/or procedures completed and all applicable results available before discharge - cultures, endoscopy, imaging, cardiac testing:  Not Met    C.  Anticipated post discharge treatment plan formulated and the following needs have been identified: unknown - PT to evaluate 10/4/18    3. Potential Barriers to Discharge:  patient needs ongoing testing for swallowing, ongoing  "clinical improvement    4. Recommended Disposition:  unknown at this time          Entered: Neelima Villafuerte 10/04/2018, 7:37 AM       VTE:  SCDs  Code Status:  DNR/SNI        Interval History:   Continues to improve with Levophed stopped at 2000 last evening and patient's blood pressures being stable overnight.  Patient's vital signs have been stable.  Eating poorly.  Voiding well. Did have some confusion overnight but has returned to baseline mentation this morning.  Tolerating medications without significant side effects.  No new concerns today.           Significant Problems:     Past Medical History:   Diagnosis Date     NO ACTIVE PROBLEMS    That is just fine so what what area         Physical Exam:   Blood pressure 106/72, pulse 60, temperature 97.4  F (36.3  C), temperature source Oral, resp. rate 18, height 1.727 m (5' 8\"), weight 48.1 kg (106 lb 0.7 oz), SpO2 98 %.  Constitutional:   awake, alert, cooperative, no apparent distress, and appears stated age     Lungs:   No increased work of breathing, good air exchange, clear to auscultation bilaterally, no crackles or wheezing     Cardiovascular:   Normal apical impulse, regular rate and rhythm     Abdomen:   Bowel sounds present, abdomen soft and non-tender     Musculoskeletal:   no lower extremity pitting edema present     Neurologic:   Awake, alert, oriented to name and place but confused about time and situation     Skin:   normal skin color, texture, turgor             Data:   All laboratory data reviewed    Attestation:  I have reviewed today's vital signs, notes, medications, labs and imaging.     Electronically Signed:  Neelima Villafuerte MD    Note: Chart documentation done in part with Dragon Voice Recognition software. Although reviewed after completion, some word and grammatical errors may remain.       "

## 2018-10-04 NOTE — PLAN OF CARE
Problem: Patient Care Overview  Goal: Plan of Care/Patient Progress Review  Outcome: No Change  VSS. Afebrile. Denies any pain/discomfort this shift. Pt voiding in urinal small amounts clear dilute yellow. LS clear throughout. Tele shows a-fib. No edema noted. Pt alert and oriented x2 at start of shift getting more confused and forgetful at end of shift. Pt awake most of night. Denies any chest pain/SOB. Up with 1-2 assist. No complaints at this time.

## 2018-10-04 NOTE — PLAN OF CARE
Problem: Patient Care Overview  Goal: Plan of Care/Patient Progress Review  Outcome: No Change  S-(situation): end of shift    B-(background): sepsis r/t aspiration pneumonia    A-(assessment): Patient sitting up eating breakfast and lunch with honey thickened liquids and dysphagia diet level 1 - patient started coughing with eating each time, took small bites and sips and ate about 10% for each meal.  Video swallow done - awaiting md to go over results with family and patient.  BP are WDL and has been off levophed since last evening.  Incontinent of Bowel and bladder and using urinal every 2 hours and voiding 50-100ml each time.  Up with assist of 1- patient able to lie to sit and sit to stand per self.  Ambulated in room 2 times and unsteady gait and patient reports weakness when asked.   CVP discontinued.  LS with crackles in bases and clears when encouraged to cough and deep breath every 2 hours. Denies SOA.       R-(recommendations): monitor

## 2018-10-05 PROBLEM — I48.19 PERSISTENT ATRIAL FIBRILLATION (H): Status: ACTIVE | Noted: 2018-01-01

## 2018-10-05 PROBLEM — I48.91 NEW ONSET ATRIAL FIBRILLATION (H): Status: RESOLVED | Noted: 2018-01-01 | Resolved: 2018-01-01

## 2018-10-05 PROBLEM — I48.91 NEW ONSET ATRIAL FIBRILLATION (H): Status: ACTIVE | Noted: 2018-01-01

## 2018-10-05 PROBLEM — E43 SEVERE MALNUTRITION (H): Status: ACTIVE | Noted: 2018-01-01

## 2018-10-05 NOTE — PROGRESS NOTES
Discharge Planner   Discharge Plans in progress:     Yes- family is planning to take patient home and for [facility] services.  Daughters are planning to take time off to assist with patient care at home along with wife.  [facility] can open patient for start of care on Tuesday, 10/9/18.    Patient has no primary doctor.  [facility] physician to follow patient, so no PCP is needed.        Barriers to discharge plan: None at this time.    Follow up plan: Care Transitions to continue to follow for discharge planning.         Entered by: HAMMAD WEI 10/05/2018 4:20 PM

## 2018-10-05 NOTE — PLAN OF CARE
Problem: Patient Care Overview  Goal: Plan of Care/Patient Progress Review  Outcome: No Change  Disoriented x2. Fatigued; slept most of this shift. VSS on RA. Afebrile. A-fib on monitor. Fine crackles in bilateral lower lobes. Incontinent of urine x3. Repositioned every 2 hours. NPO status. BG 77 and 95. Denies pain. Resting comfortably in bed at this time.

## 2018-10-05 NOTE — PLAN OF CARE
Problem: Patient Care Overview  Goal: Plan of Care/Patient Progress Review  Pt has been lethargic and sleeping throughout the night but awakens easily. Unable to assess orientation as pt is not willing to talk tonight. Vitals have been stable and pt has remained off of the levophed for greater than 24 hours now. Blood sugars WNL. Tele has been a fib with BBB; rates in the 60-80's. Pt has been incontinent during the night where the previous night he would request to use the bathroom. No complaints of pain. He remains NPO due to failing the video swallow yesterday. Will address next steps during a family care conference planned for today.

## 2018-10-05 NOTE — PROGRESS NOTES
[facility] Progress Note          Assessment and Plan:   Assessment:    Principal Problem:    Septic shock (H)    Assessment: Characterized by severe hypotension requiring pressors, elevated lactic acid, leukocytosis, and history of choking while eating with infiltrates bilaterally on his chest x-ray.  Patient continues to improve on Zosyn and azithromycin.  Has been off pressor support for over 24 hours with blood pressures in the  systolic but stable.  WBC has normalized, lactic acid is normal.  Patient is not needing any oxygen and has remained afebrile.      Plan: We will continue with Zosyn and azithromycin IV for antibiotic coverage, transition patient out of ICU status, continue with strict NPO status given severe dysphagia discovered during this stay and proceed with care conference this afternoon to discuss goals of care going forward.       Active Problems:    Aspiration pneumonia (H) - suspected    Assessment: Suspected based on clinical history, patient improving with Zosyn and azithromycin as above    Plan: Proceed with treatment as above       Dysphagia    Assessment: noted by family for months prior to this admission and seem to be worsening. Video swallow evaluation performed yesterday shows patient is unsafe for any oral intake - strict NPO status is recommended.  Patient has been noted to have difficulty swallowing his own secretions during this stay    Plan: Will proceed with family care conference this afternoon to discuss options going forward. Continue IV fluids for now.        Persistent atrial fibrillation    Assessment:  No known history, however a fib was seen on initial EKG and has persisted throughout this hospitalization to date.  Rate controlled.      Plan:  Will perform repeat EKG this morning and proceed with echocardiogram for further evaluation.  Proceed with care conference this afternoon.       Hypokalemia    Assessment:  Ongoing with potassium today is 3.2, likely  secondary to nutritional deficit complicated by aggressive fluid resuscitation needed during this hospitalization     Plan:  Replacement protocol per IV replacement continues, will replace and monitor as needed       Lactic acidosis    Assessment: secondary to sepsis, now resolved    Plan: continue treatment of sepsis as above        Hyperglycemia    Assessment: one time occurrence of unclear etiology with HgbA1C is 5.1.  Blood sugars have been 90-100s with dextrose containing IV fluids     Plan: continue dextrose containing IV fluids and monitor for blood sugars stability         Protein-calorie malnutrition; Severe malnutrition    Assessment: poor oral intake for several months secondary to decreased appetite and difficulty with swallowing, progressively worsening    Plan: Nutritionist evaluation and recommendations are appreciated.  Given patient's strict NPO status will proceed with care conference this afternoon to discuss options going forward.        Acute cystitis with hematuria    Assessment: suspected initially based on UA results however urine culture has shown no growth    Plan: Continue with treatment of sepsis as above, monitor urinary output closely       Anemia    Assessment: Workup reveals likely anemia of chronic disease etiology with hemoglobin stable in the mid 8- 9 range following initial fluid resuscitation    Plan: continue to monitor for stability       Hypocalcemia    Assessment: thought secondary to nutritional deficit, improved with IV supplementation but now ionized calcium low again at 4.0.     Plan: Will supplement with 4 grams of calcium gluconate, proceed with care conference and readdress based on wishes going forward.             # Pain Assessment:  Current Pain Score 10/5/2018   Patient currently in pain? gilmaies   Brady s pain level was assessed and he currently denies pain.      VTE:  SCDs  Code Status:  DNR/DNI        Interval History:   About the same today.  Vitals signs  "stable with patient off Levophed for over 24 hours.  Tolerating medications and treatment plan without significant side effects or problems.  Continues in atrial fibrillation.  Voiding well.  No new concerns today.  Slept well overnight.           Significant Problems:     Past Medical History:   Diagnosis Date     NO ACTIVE PROBLEMS             Physical Exam:   Blood pressure 107/69, pulse 90, temperature 97.2  F (36.2  C), temperature source Oral, resp. rate 10, height 1.727 m (5' 8\"), weight 47.7 kg (105 lb 2.6 oz), SpO2 99 %.  Constitutional:   awake, alert, cooperative, no apparent distress, and appears stated age     Lungs:   No increased work of breathing, good air exchange, clear to auscultation bilaterally, no crackles or wheezing     Cardiovascular:   Irregularly irregular rhythm     Abdomen:   Bowel sounds present, abdomen soft     Musculoskeletal:   no lower extremity pitting edema present     Neurologic:   Awake, alert, oriented to name and place but not time or situation - he was able to retain some of the information given to him during this visit when asked questions at the end of the visit.  He is able to verbalize what he thinks his wishes are at this time.              Data:   All laboratory data reviewed    Attestation:  I have reviewed today's vital signs, notes, medications, labs and imaging.     Electronically Signed:  Neelima Villafuerte MD    Note: Chart documentation done in part with Dragon Voice Recognition software. Although reviewed after completion, some word and grammatical errors may remain.       "

## 2018-10-05 NOTE — PROGRESS NOTES
Present at this care conference was the patient, his wife, his 3 daughters (one by phone), myself and Anne, .  Of note, patient continues to cognitively improve and is alert and oriented to person and place but still not time and only remembers some of the discussion regarding situation that occurred earlier.  He is able to clearly and consistently verbalize his wishes throughout this care conference.      Reviewed in detail septic shock and the aspiration pneumonia identified, atrial fibrillation and the potential complications and stroke risk of this going forward if it is chronic in nature and benefit and risk of anticoagulation (especially in light of patient's current fragility and risk of falls), results of the video swallow evaluation and recommendation of strict NPO for the remainder of his life given his high risk for recurrent aspiration pneumonia going forward.  Did discuss the possible causes of why this dysphagia is happening and the possible further testing that could be done, however it would not .  Reviewed physical therapy's recommendations that at this point given his level of weakness following sepsic shock TCU be considered at time of discharge.  All questions regarding these topics were answered.    Then discussed options going forward as patient recovers from this current aspiration pneumonia severe sepsis.    1.  Patient could be strict n.p.o. for the remainder of his life and proceed with a feeding tube and artificial nutrition.  Discussed the benefit and risk of the feeding tube insertion procedure as well as the risk for refeeding syndrome and need for close medical monitoring during initiation of artificial nutrition.  Discussed what artificial nutrition would look like long-term if it is well-tolerated by the patient and the difference between oral nutrition and artificial nutrition.  With this option did discuss patient is still at risk for aspiration  pneumonia as he is starting to have difficulty even swallowing his own secretions but this would make the risk of aspiration as low as possible.  He could definitely be hospitalized if needed with any further aspiration that occurred.  Did discuss that with his age and underlying conditions this course may extend the patient's life by months and potentially years.  They could also choose to start Coumadin or end of very anticoagulation for the underlying A. fib or secondary to his fall risk choose to not anticoagulate     2.  Discussed placing a feeding tube and providing the artificial nutrition as above but allowing patient to eat and drink as he desires, knowing the likelihood of aspiration pneumonia increases with oral intake but would improve patient's quality of life.  He could definitely be hospitalized if aspiration pneumonia were to occur.    3.  Patient could transition to comfort care status with consideration of hospice involvement and be allowed to eat and drink whatever he desired with the goal on focusing on quality of life for the time he has remaining and not quantity of life.  Did discuss that if another pneumonia occurred  focus would be on improving patient's symptoms and not necessarily curing the pneumonia.      Patient is very strongly able to verbalize that he wants to be able to eat and drink for the remainder of his life.  He also wants to continue things as they are and not get more aggressive.  Family verbalizes that this is very consistent to who he is and what he would want prior to this illness and confusion.  After further conversation, family all in agreement to  continue with treatment of this recurrent aspiration pneumonia but goal would be to discharge home with 24/7 family support and with initiation of hospice services.  The patient will be allowed to eat and drink anything he desires for the remainder of this hospital stay and beyond.  We will not anticoagulate for the atrial  fibrillation now or going forward.      Social work with try to connect with a hospice organization and see if they can come and discuss hospice in more detail.      68 minutes were spent in this care conference.      Electronically Signed:  Neelima Villafuerte MD

## 2018-10-05 NOTE — CARE CONFERENCE
" attended the family care conference held in patient's room today.  In attendance: patient, wife Angelina, daughter Zena from [place], daughter rTish from [place], another daughter and a son were over speaker phone, writer, and hospitalist Dr. Villafuerte.      Patient's current medical status thoroughly reviewed.  Atrial fibrillation, aspiration, malnutrition, pneumonia. All options for possible treatment discussed in each diagnosis.  Option for less aggressive or non-treatment also discussed including hospice services.      Patient awake throughout the conference and did state a few times, \"Let things be the way they will be.\"  Patient has confusion and wife feels he is a bit more confused that his baseline.       Family is talking together and considering all of the options.  Writer spoke with them about the option of a hospice consult.  They requested this afternoon yet, if possible, as Trish, daughter from [place] has to fly home tomorrow.  Family ok with [facility] Consult and CHASTITY Newell from Hospice is here visiting with family now.      8112- Family is planning for [facility] care at home.  [facility] stated that they soonest they could open patient on Tuesday, October 9th.  It is anticipated that patient will need to be in the hospital at least until Monday.      Care Transitions will continue to follow for discharge planning.           "

## 2018-10-05 NOTE — PLAN OF CARE
Problem: Patient Care Overview  Goal: Plan of Care/Patient Progress Review  Discharge Planner PT   Patient plan for discharge: Home with wife  Current status: Alert, oriented to self. MOD IND supine to sitting EOB, IND rolling. SBA sit to/from stand with press up through bed yet poor safety awareness with decent. Completed 3 sit to/from stands with LE supported by bed to aid in stability and no assistive device.  Completed pre-gait marching without assistive device and patient demonstrate retropulsion without balance reaction. 4 steps forward, turning and 3 steps back to bed with Min assist for balance and safety without the use of assistive device. Required verbal cues for environment navigation. 1 sit to stand with walker improved posture and weight control. Bed to chair transfer with use of walker and CGA for safety. Poor environment navigation and command following, verbal cues provided for safe mobilization. Stand to sit with walker in front completed without placing hands on chair and demonstrating poor decent control. Good short sitting balance.   Barriers to return to prior living situation: Impaired functional mobility, impaired safety insight, decreased strength  Recommendations for discharge: TCU  Rationale for recommendations: Patient would benefit from TCU placement in order to received continues skilled therapeutic intervention for strengthening, safety training and activity tolerance progression due to acute illness and falls. Safety concerns regarding falling backwards, impaired environment navigation, poor use of walker which is warranted due to weakness and impaired balance.        Entered by: Katelin Juarez 10/05/2018 11:28 AM

## 2018-10-06 NOTE — PLAN OF CARE
Problem: Patient Care Overview  Goal: Plan of Care/Patient Progress Review  Outcome: Improving  Discharge Planner PT   Patient plan for discharge: home with hospice was the report with rounding today  Current status: independent bed mobility, SBA sit to stand, CGA for ambulation in room using FWW.  Barriers to return to prior living situation: decreased functional level, weakness  Recommendations for discharge: after rounding today the report was home with hospice, PT note yesterday listed TCU  Rationale for recommendations: pt will need good family support if home. Plan to see pt tomorrow and further assess.       Entered by: YOON WALTERS 10/06/2018 10:04 AM

## 2018-10-06 NOTE — PLAN OF CARE
Problem: Patient Care Overview  Goal: Plan of Care/Patient Progress Review  Outcome: Improving  Patient is alert and oriented to self and place. Patient denies any pain on this shift. Patient has been incontinent of stool and urine several times through the night. Lung sounds are diminished but clear throughout. Blood sugar was 93. Vital signs stable on room air, afebrile. Will continue to monitor.

## 2018-10-06 NOTE — PLAN OF CARE
Problem: Sepsis/Septic Shock (Adult)  Goal: Signs and Symptoms of Listed Potential Problems Will be Absent, Minimized or Managed (Sepsis/Septic Shock)  Signs and symptoms of listed potential problems will be absent, minimized or managed by discharge/transition of care (reference Sepsis/Septic Shock (Adult) CPG).   Patient's vital signs stable and within normal limits.   He alert and oriented to self and location, he calls out to make needs known.   Glucoses are stable.  He denies pain.  LS are clear and diminished throughout.   Patient using urinal and incontinent as well.   He offers no complaints.

## 2018-10-06 NOTE — PROGRESS NOTES
[facility] Progress Note          Assessment and Plan:   Assessment:   Principal Problem:    Septic shock (H)    Assessment: Characterized by severe hypotension requiring pressors, elevated lactic acid, leukocytosis, and history of choking while eating with infiltrates bilaterally on his chest x-ray.  Patient continues to improve on Zosyn and azithromycin.  Has been off pressor support for over 48 hours with blood pressures in the  systolic but stable.  WBC has normalized, lactic acid is normal.  Patient is not needing any oxygen and has remained afebrile.     Plan: Based on care conference outcome from yesterday, plan is to complete treatment of current pneumonia and discharged home with hospice.  Patient is wanting to switch to oral antibiotics if it would help him get home faster and wife is in agreement to this.  Will transition to liquid Augmentin and stop the Zosyn.  Continue with IV azithromycin as this evening's dose is the final treatment.  Will work with hospice to get the equipment needed to allow patient to discharge home.  Anticipate discharge home in the next 1-2 days.        Active Problems:    Aspiration pneumonia (H) - suspected    Assessment: Suspected based on clinical history, patient improving with Zosyn and azithromycin as above    Plan: Proceed with treatment as above and transition to PO Augmentin        Dysphagia    Assessment: noted by family for months prior to this admission and seem to be worsening. Video swallow evaluation performed during this stay shows patient is unsafe for any oral intake    Plan: Following a care conference yesterday, decision has been that patient will eat and drink as he desires for the remainder of his life and will discharge home with hospice at the completion of this hospital stay       Persistent atrial fibrillation    Assessment:  No known history, however a fib was seen on initial EKG and has been present intermittently throughout this  hospitalization to date.  Rate controlled when present    Plan:   Based on care conference from yesterday, no anticoagulation will be performed now or going forward based on patient's high fall risk and  transition to comfort care       Hypokalemia    Assessment: Present initially and thought secondary to nutritional depletion.  Has corrected with supplementation    Plan: No further monitoring is needed at this time        Lactic acidosis    Assessment: secondary to sepsis, now resolved    Plan: continue treatment of sepsis as above         Hyperglycemia    Assessment: one time occurrence of unclear etiology with HgbA1C is 5.1.  Blood sugars have been 90-100s with dextrose containing IV fluids     Plan: continue dextrose containing IV fluids for the duration of his hospitalization.  We will discontinue blood sugar monitoring given transition to comfort care status.        Protein-calorie malnutrition; Severe malnutrition    Assessment: poor oral intake for several months secondary to decreased appetite and difficulty with swallowing, progressively worsening    Plan: Patient is transitioning to comfort care and hospice involvement as above.         Acute cystitis with hematuria    Assessment: suspected initially based on UA results however urine culture has shown no growth    Plan: No further treatment is needed        Anemia    Assessment: Workup reveals likely anemia of chronic disease etiology with hemoglobin stable in the mid 8- 9 range following initial fluid resuscitation    Plan: No further monitoring is necessary        Hypocalcemia    Assessment: thought secondary to nutritional deficit, improved but did not fully resolve with IV supplementation     Plan: No further treatment or monitoring is needed       # Pain Assessment:  Current Pain Score 10/6/2018   Patient currently in pain? partha Abreu s pain level was assessed and he currently denies pain.        Disposition Plan   Discharge Planning and  "Disposition    1.  Clinically stable enough to begin D/C disposition and planning: Yes      2.  Patient Goals for Discharge:      A.  Clinical - The following need to be achieved for discharge:  adequate comfort achieved, stable neurologic status and stable vital signs    B.  Diagnostic testing and/or procedures completed and all applicable results available before discharge - cultures, endoscopy, imaging, cardiac testing:  Met    C.  Anticipated post discharge treatment plan formulated and the following needs have been identified: hospice    3. Potential Barriers to Discharge:  Durable Medical Equipment:  will need hospital bed - hospice arranging    4. Recommended Disposition:  Home with hospice          Entered: Neelima Villafuerte 10/06/2018, 12:57 PM       VTE:  SCDs  Code Status:  DNR/DNI        Interval History:   About the same today.  Vitals signs stable.  Tolerating medications and treatment plan without significant side effects or problems.  Eating poorly and coughing often but enjoying his food.  Voiding well.  No new concerns today.           Significant Problems:     Past Medical History:   Diagnosis Date     NO ACTIVE PROBLEMS             Physical Exam:   Blood pressure 103/52, pulse 69, temperature 96.2  F (35.7  C), resp. rate 16, height 1.727 m (5' 8\"), weight 48.3 kg (106 lb 7.7 oz), SpO2 99 %.  Constitutional:   awake, alert, cooperative, ongoing cachetic appearance but no apparent distress, and appears stated age     Lungs:   No increased work of breathing, good air exchange, clear to auscultation bilaterally, no crackles or wheezing     Cardiovascular:   Normal apical impulse, regular rate and rhythm currently     Abdomen:   Bowel sounds present, abdomen soft and non-tender     Musculoskeletal:   no lower extremity pitting edema present     Neurologic:   Awake, alert, oriented to name and place but not time or situation.  He is able to recall parts of the conversation yesterday and " "continues to affirm he wants to go home and \"just let it be.  If I die, I die.\"               Data:   All laboratory data reviewed    Attestation:  I have reviewed today's vital signs, notes, medications, labs and imaging.     Electronically Signed:  Neelima Villafuerte MD    Note: Chart documentation done in part with Dragon Voice Recognition software. Although reviewed after completion, some word and grammatical errors may remain.       "

## 2018-10-07 NOTE — PROGRESS NOTES
[facility] Progress Note          Assessment and Plan:   Assessment:    Principal Problem:    Septic shock (H)    Assessment: Characterized by severe hypotension requiring pressors, elevated lactic acid, leukocytosis, and history of choking while eating with infiltrates bilaterally on his chest x-ray, resolved with treatment.  Patient initially received Zosyn and  azithromycin and has now transitioned to oral Augmentin for course completion of aspiration pneumonia etiology.  Dysphasia evaluation has been performed and patient has severe oropharyngeal dysphasia and is not safe to swallow anything.  He has difficulty even handling his own secretions.    Plan: Based on care conference outcome that occurred during this hospitalization, plan is to complete treatment of current pneumonia and discharged home with hospice.  Will work with hospice to get the equipment needed to allow patient to discharge home.  Anticipate discharge home in the next 1-2 days when all equipment is available.        Active Problems:    Aspiration pneumonia (H) - suspected    Assessment: Suspected based on clinical history, patient improving from an infection standpoint with PO Augmentin    Plan:  Continue with Augmentin for a total 10-day course completion of antibiotics        Dysphagia    Assessment: noted by family for months prior to this admission and seem to be worsening. Video swallow evaluation performed during this stay shows severe oropharyngeal dysphagia and patient is unsafe for any oral intake    Plan: As above, decision has been that patient will eat and drink as he desires for the remainder of his life and will discharge home with hospice at the completion of this hospital stay        Persistent atrial fibrillation    Assessment:  No known history, however a fib was seen on initial EKG and has been present intermittently throughout this hospitalization to date.  Rate controlled when present    Plan:   no anticoagulation  will be performed now or going forward based on patient's high fall risk and transition to comfort care        Hypokalemia    Assessment: Present initially and thought secondary to nutritional depletion.  Has corrected with supplementation    Plan: No further monitoring is needed at this time        Lactic acidosis    Assessment: secondary to sepsis, now resolved    Plan: continue treatment of sepsis as above         Hyperglycemia    Assessment: one time occurrence of unclear etiology with HgbA1C is 5.1.  Blood sugars had been 90-100s with dextrose containing IV fluids - monitoring discontinued yesterday per patient request    Plan: no further monitoring is needed.        Protein-calorie malnutrition; Severe malnutrition    Assessment: poor oral intake for several months secondary to decreased appetite and difficulty with swallowing, progressively worsening    Plan: Patient is transitioning to comfort care and hospice involvement as above.         Acute cystitis with hematuria    Assessment: suspected initially based on UA results however urine culture has shown no growth    Plan: No further treatment is needed        Anemia    Assessment: Workup reveals likely anemia of chronic disease etiology with hemoglobin stable in the mid 8- 9 range following initial fluid resuscitation    Plan: No further monitoring is necessary        Hypocalcemia    Assessment: thought secondary to nutritional deficit, improved but did not fully resolve with IV supplementation     Plan: No further treatment or monitoring is needed           # Pain Assessment:  Current Pain Score 10/6/2018   Patient currently in pain? partha Abreu s pain level was assessed and he currently denies pain.      VTE:  SCD  Code Status:  DNR/DNI        Interval History:   About the same today.  Vitals signs stable.  Tolerating medications and treatment plan without significant side effects or problems.  Eating poorly and coughing often with any food or drink  "consumption.  Voiding well.  No new concerns today.           Significant Problems:     Past Medical History:   Diagnosis Date     NO ACTIVE PROBLEMS             Physical Exam:   Blood pressure 116/73, pulse 78, temperature 97.1  F (36.2  C), temperature source Oral, resp. rate 16, height 1.727 m (5' 8\"), weight 48.3 kg (106 lb 7.7 oz), SpO2 99 %.  Constitutional:   awake, alert, cooperative, cachetic in appearance but no apparent distress, and appears stated age     Lungs:   No increased work of breathing, good air exchange, clear to auscultation bilaterally, no crackles or wheezing     Cardiovascular:   Irregularly irregular rhythm currently noted     Abdomen:   Bowel sounds present, abdomen soft and non-tender     Musculoskeletal:   no lower extremity pitting edema present     Neurologic:   Awake, alert, oriented to name, place and overall to situation            Data:   No new lab data today    Attestation:  I have reviewed today's vital signs, notes, medications, labs and imaging.     Electronically Signed:  Neelima Villafuerte MD    Note: Chart documentation done in part with Dragon Voice Recognition software. Although reviewed after completion, some word and grammatical errors may remain.       "

## 2018-10-07 NOTE — PLAN OF CARE
Problem: Patient Care Overview  Goal: Discharge Needs Assessment  Outcome: Change based on patient need/priority Date Met: 10/07/18      Problem: Memory Impairment Comorbidity  Goal: Memory Impairment Comorbidity  Patient comorbidity will be monitored for signs and symptoms of Memory Impairment condition.  Problems will be absent, minimized or managed by discharge/transition of care.   Outcome: No Change  Ate a few bites of mashed potatoes with gravy and mushrooms and swallowed without difficulty. Requested a small bite of the [employer] steak which he chewed for quite a while and coughed a bit then spit it out with encouragement. Drank about 50cc of Boost. Oriented to self and place. Pleasant and denied pain. BP was 86/54. IV fluids infusing. Bed alarm. Will continue to monitor I&O, BP's, safety. Will provide support to pt and family.

## 2018-10-07 NOTE — PLAN OF CARE
Problem: Patient Care Overview  Goal: Plan of Care/Patient Progress Review  Outcome: No Change  Denies pain. Alert and cooperative. VSS. Poor appetite and coughs often after eating. Incontinent of urine X 2, lungs clear.

## 2018-10-07 NOTE — PLAN OF CARE
Problem: Patient Care Overview  Goal: Plan of Care/Patient Progress Review  Outcome: Improving  Patient is alert and oriented to self and place. Patient is intermittently disoriented to situation and is always disoriented to time. Patient is calm and cooperative. Patient is incontinent of urine and stool, will sometimes use urinal but only voids a small amount. Vital signs are stable on room air, afebrile. Will continue to monitor.

## 2018-10-07 NOTE — PLAN OF CARE
Problem: Patient Care Overview  Goal: Plan of Care/Patient Progress Review  Outcome: No Change  VSS on RA. Afebrile. Lungs clear throughout. Disoriented x2 this shift with increased confusion/agitation. Administered 0.5mg of Ativan at 2100 for restlessness. Repositions self independently. Pt. calls out frequently to use the urinal; only voids 50-75ml at a time and then is incontinent of urine. Poor appetite. Denies pain.

## 2018-10-07 NOTE — PLAN OF CARE
Problem: Patient Care Overview  Goal: Plan of Care/Patient Progress Review  Outcome: Therapy, progress towards functional goals is fair  Discharge Planner PT   Patient plan for discharge: home on Tuesday with hospice  Current status: SBA for bed mobility, min assist today for sidelying to sit transfer, CGA x 1 sit to standing and FWW ambulation in room.  Barriers to return to prior living situation: fall history, weakness  Recommendations for discharge: family prefers pt home with hospice, wheeled walker for ambulation  Rationale for recommendations: weakness, imbalance, fall history       Entered by: YOON WALTERS 10/07/2018 9:30 AM

## 2018-10-08 NOTE — PROGRESS NOTES
Name: Brady Santiago    MRN#: [id number]    Reason for Hospitalization: Septic shock (H) [A41.9, R65.21]  Aspiration pneumonia of left lower lobe, unspecified aspiration pneumonia type (H) [J69.0]  Urinary tract infection in male [N39.0]    Discharge Date: 10/8/2018    Patient / Family response to discharge plan: in agreement    Follow-Up Appt: Future Appointments  Date Time Provider Department Center   10/8/2018 2:30 PM Katelin Juarez, PT [facility] [place] [facility]       Other Providers (Care Coordinator, County Services, PCA services etc): No    Discharge Disposition: home with family [facility] [phone] - Equipment delivered today.  [facility] will open patient tomorrow at 1300.  Family transport home.    ROBERT Dean  [facility] [phone]/ [facility] [phone]

## 2018-10-08 NOTE — PROGRESS NOTES
S-(situation): Patient discharged to home with Hospice Care via wheelchair with wife and daughter.    B-(background): Aspiration Pneumonia    A-(assessment): VSS, denies pain. Alert only to self. Eating a regular diet. Up with assist of 1 with walker.    R-(recommendations): Discharge instructions reviewed with wife and daughter. Listed belongings gathered and returned to patient.        Discharge Nursing Criteria:     Care Plan and Patient education resolved: Yes    New Medications- pt has been educated about purpose and side effects: Yes    Vaccines  Influenza status verified at discharge:  Yes (Patient/Family Refused)      MISC  Prescriptions if needed, hard copies sent with patient  Yes  Home and hospital aquired medications returned to patient: NA  Medication Bin checked and emptied on discharge Yes  Patient reports post-discharge pain management plan is effective: Yes

## 2018-10-08 NOTE — PROGRESS NOTES
Brady Santiago  Gender: male  : 1927  [id number] [facility] [id number] [phone] (home)     Medical Record: [id number]  Pharmacy: Data Unavailable  Primary Care Provider: No Ref-Primary, Physician    Parent's names are: Data Unavailable (mother) and Data Unavailable (father).      [facility]  2018     Discharge Phone Call:  Home with Hospice

## 2018-10-08 NOTE — DISCHARGE SUMMARY
[facility] Discharge Summary    Brady Santiago MRN# [id number]   Age: 89 year old YOB: 1927     Date of Admission:  10/2/2018  Date of Discharge::  10/8/2018  Admitting Physician:  Cade Fishman MD  Discharge Physician:  Neelima Villafuerte MD    Home clinic: None - will be followed by the hospice physician           Admission Diagnoses:   Septic shock (H) [A41.9, R65.21]  Aspiration pneumonia of left lower lobe, unspecified aspiration pneumonia type (H) [J69.0]  Urinary tract infection in male [N39.0]          Discharge Diagnosis:   Principal Problem:    Septic shock (H)    Assessment: Characterized by severe hypotension requiring pressors, elevated lactic acid, leukocytosis, and history of choking while eating with infiltrates bilaterally on his chest x-ray, resolved with treatment.  Patient initially received Zosyn and azithromycin and was transitioned to oral Augmentin for course completion of aspiration pneumonia etiology.  Dysphasia evaluation has been performed and patient has severe oropharyngeal dysphasia and is not safe to swallow anything.  He has difficulty even handling his own secretions.  Based on care conference outcome that occurred during this hospitalization, plan is to complete treatment of current pneumonia and discharged home with hospice.    Plan:   Discharge home with hospice initiation and ongoing p.o. Augmentin course completion of current aspiration pneumonia infection.  Ongoing c hospice recommendations.are as per       Active Problems:    Aspiration pneumonia (H) - suspected    Assessment: Suspected based on clinical history, patient improving from an infection standpoint with PO Augmentin    Plan:  Continue with Augmentin for a total 10-day course completion of antibiotics at time of discharge        Dysphagia    Assessment: noted by family for months prior to this admission. Video swallow evaluation performed during this stay shows severe oropharyngeal  dysphagia and patient is unsafe for any oral intake without aspiration risk.    Plan: As above, decision has been that patient will eat and drink as he desires for the remainder of his life and will discharge home with hospice.        Protein-calorie malnutrition; Severe malnutrition    Assessment: poor oral intake for several months secondary to decreased appetite and difficulty with swallowing, progressively worsening    Plan: Patient is transitioning to comfort care and hospice involvement as above.         Persistent atrial fibrillation    Assessment:  No known history, however a fib was seen on initial EKG and has been present intermittently throughout this hospitalization to date.  Rate controlled when present    Plan:   no anticoagulation will be performed now or going forward based on patient's high fall risk and transition to comfort care.          Hypokalemia    Assessment: Present initially and thought secondary to nutritional depletion.  Has corrected with supplementation    Plan: No further monitoring is needed at time of discharge         Lactic acidosis    Assessment: secondary to sepsis, now resolved    Plan: No further monitoring is needed        Hyperglycemia    Assessment: one time occurrence of unclear etiology with HgbA1C is 5.1.      Plan: no further monitoring is needed.        Acute cystitis with hematuria    Assessment: suspected initially based on UA results however urine culture has shown no growth    Plan: No further treatment is needed        Anemia    Assessment: Workup reveals likely anemia of chronic disease etiology with hemoglobin stable in the mid 8- 9 range following initial fluid resuscitation    Plan: No further monitoring is necessary        Hypocalcemia    Assessment: thought secondary to nutritional deficit, improved but did not fully resolve with IV supplementation     Plan: No further treatment or monitoring is needed    # Discharge Pain Plan:   - Patient currently has NO  PAIN and is not being prescribed pain medications on discharge.            Procedures:   No procedures performed during this admission          Medications Prior to Admission:     No prescriptions prior to admission.             Discharge Medications:     Current Discharge Medication List      START taking these medications    Details   amoxicillin-clavulanate (AUGMENTIN) 400-57 MG/5ML suspension Take 6.3 mLs (500 mg) by mouth 3 times daily for 4 days  Qty: 75.6 mL, Refills: 0    Associated Diagnoses: Aspiration pneumonia of left lower lobe, unspecified aspiration pneumonia type (H)                   Consultations:   No consultations were requested during this admission          Brief History of Illness:   Patient is a 89-year-old gentleman who presented to the emergency room with significant weakness, several month history of coughing with eating and drinking associated with significant weight loss, and some mild memory issues which is his baseline.  Initially he was brought to the clinic and was found to be severely hypotensive and was brought immediately to the emergency room where he was found to have septic shock with bilateral infiltrates seen on his chest x-ray and admitted to the ICU for ongoing management.          Hospital Course:   In the ICU the patient was given fluid bolus as well as a central line placed with initiation of levo fed.  Patient was placed on Zosyn and Zithromax for likely pneumonia etiology.  He did have progressive improvement and resolution of the septic shock and was noted to have significant cough with any oral intake.  Speech therapy did evaluate and a video swallow study was performed which showed severe oropharyngeal dysphasia and patient was deemed unsafe to have any oral intake without significant increase in aspiration risk.  Prolonged conversation happened with the patient and family members and patient's wishes at this time is to be allowed to eat or drink whatever he pleases  even if it does hasten his death.  He is not wanting any aggressive interventions going forward.  He will be going home with hospice initiation to honor his wishes.         Physical Exam:   Vitals were reviewed  Constitutional:   awake, alert, cooperative, ongoing cachetic appearance but no apparent distress, and appears stated age     Lungs:   No increased work of breathing, good air exchange, clear to auscultation bilaterally, no crackles or wheezing     Cardiovascular:   Irregularly irregular rhythm currently but rate controlled      Abdomen:   Bowel sounds present, abdomen soft and non-tender     Musculoskeletal:   no lower extremity pitting edema present     Neurologic:   Awake, alert, oriented to person and place and somewhat to situation but knows what he wants when discussing plan for discharge               Discharge Instructions and Follow-Up:   Discharge diet: Regular   Discharge activity: Activity as tolerated   Discharge follow-up: Follow up with hospice team - they will evaluate patient at his home tomorrow.            Discharge Disposition:   Discharged to home with hospice initiation       Attestation:  I have reviewed today's vital signs, notes, medications, labs and imaging.    More than 30 minutes was spent on this discharge.      Neelima Villafuerte MD    Note: Chart documentation done in part with Dragon Voice Recognition software. Although reviewed after completion, some word and grammatical errors may remain.

## 2018-10-08 NOTE — PROGRESS NOTES
S-(situation): End of Shift Note    B-(background): Pneumonia    A-(assessment): Alert to self with some confusion to where he is at. VSS. Afebrile. Tolerating RA with SATS mid 90's. LS fine crackles in bases . Patient denied pain. Patient needs strong encouragement to drink fluids. Patient used urinal at bedside and was also incontinent of urine.No stool.  R-(recommendations): Monitor VS, I&O, Labs

## 2018-10-08 NOTE — PLAN OF CARE
Problem: Patient Care Overview  Goal: Plan of Care/Patient Progress Review  Physical Therapy Discharge Summary    Reason for therapy discharge:    Discharged to home.    Progress towards therapy goal(s). See goals on Care Plan in Epic electronic health record for goal details.  Goals partially met.  Barriers to achieving goals:   limited tolerance for therapy and discharge from facility.    Therapy recommendation(s):    Continued therapy is recommended.  Rationale/Recommendations:  as patient tolerates to improve function and strength with the transition to hospice cares.
